# Patient Record
Sex: FEMALE | Race: WHITE | Employment: UNEMPLOYED | URBAN - METROPOLITAN AREA
[De-identification: names, ages, dates, MRNs, and addresses within clinical notes are randomized per-mention and may not be internally consistent; named-entity substitution may affect disease eponyms.]

---

## 2017-08-14 ENCOUNTER — TRANSFERRED RECORDS (OUTPATIENT)
Dept: HEALTH INFORMATION MANAGEMENT | Facility: CLINIC | Age: 42
End: 2017-08-14

## 2017-08-15 ENCOUNTER — TRANSFERRED RECORDS (OUTPATIENT)
Dept: HEALTH INFORMATION MANAGEMENT | Facility: CLINIC | Age: 42
End: 2017-08-15

## 2017-08-29 ENCOUNTER — TRANSFERRED RECORDS (OUTPATIENT)
Dept: HEALTH INFORMATION MANAGEMENT | Facility: CLINIC | Age: 42
End: 2017-08-29

## 2017-08-31 ENCOUNTER — TRANSFERRED RECORDS (OUTPATIENT)
Dept: HEALTH INFORMATION MANAGEMENT | Facility: CLINIC | Age: 42
End: 2017-08-31

## 2017-10-01 ENCOUNTER — HEALTH MAINTENANCE LETTER (OUTPATIENT)
Age: 42
End: 2017-10-01

## 2020-02-23 ENCOUNTER — HEALTH MAINTENANCE LETTER (OUTPATIENT)
Age: 45
End: 2020-02-23

## 2020-12-13 ENCOUNTER — HEALTH MAINTENANCE LETTER (OUTPATIENT)
Age: 45
End: 2020-12-13

## 2021-02-21 ENCOUNTER — HEALTH MAINTENANCE LETTER (OUTPATIENT)
Age: 46
End: 2021-02-21

## 2021-04-11 ENCOUNTER — HEALTH MAINTENANCE LETTER (OUTPATIENT)
Age: 46
End: 2021-04-11

## 2021-09-26 ENCOUNTER — HEALTH MAINTENANCE LETTER (OUTPATIENT)
Age: 46
End: 2021-09-26

## 2022-03-13 ENCOUNTER — HEALTH MAINTENANCE LETTER (OUTPATIENT)
Age: 47
End: 2022-03-13

## 2022-05-08 ENCOUNTER — HEALTH MAINTENANCE LETTER (OUTPATIENT)
Age: 47
End: 2022-05-08

## 2023-01-08 ENCOUNTER — HEALTH MAINTENANCE LETTER (OUTPATIENT)
Age: 48
End: 2023-01-08

## 2023-04-23 ENCOUNTER — HEALTH MAINTENANCE LETTER (OUTPATIENT)
Age: 48
End: 2023-04-23

## 2023-06-02 ENCOUNTER — HEALTH MAINTENANCE LETTER (OUTPATIENT)
Age: 48
End: 2023-06-02

## 2023-09-26 ENCOUNTER — TRANSCRIBE ORDERS (OUTPATIENT)
Dept: OTHER | Age: 48
End: 2023-09-26

## 2023-09-26 DIAGNOSIS — G60.8 POLYNEUROPATHY, PERIPHERAL SENSORIMOTOR AXONAL: Primary | ICD-10-CM

## 2023-09-27 NOTE — TELEPHONE ENCOUNTER
Action 9/27/23 MV 11.01am   Action Taken 1) imaging request faxed to [facility]  2) records request faxed to [facility]     Action 10/18/23 MV 11.04am   Action Taken 2nd request faxed to [facility] and [facility]     Action 11/8/23 MV 11.31am   Action Taken Images resolved in PACS ; [facility] records received and sent to scanning       RECORDS RECEIVED FROM: external   REASON FOR VISIT: neuropathy   Date of Appt: 1/19/24   NOTES (FOR ALL VISITS) STATUS DETAILS   OFFICE NOTE from referring provider Care Everywhere Dr Jessenia Gusman @ [facility]:  9/22/22 encounter   OFFICE NOTE from other specialist Received/CE Dr Mable Celis @ [facility]:  8/29/23    Dr Rawls @ [facility]:  8/14/17  5/19/17  11/15/15  (Additional encounters)   DISCHARGE SUMMARY from hospital Care Everywhere [facility]:  8/23/23-8/25/23   DISCHARGE REPORT from the ER Care Everywhere [facility]:  8/15/23   EMG Received/CE [facility]:  11/9/22    [facility]:  8/29/17  5/20/15   MEDICATION LIST Care Everywhere    IMAGING  (FOR ALL VISITS)     MRI (HEAD, NECK, SPINE) PACS [facility]:  MRI Lumbar Spine 8/25/21    [facility]:  MRI Head 5/23/13  MRI Cervical Spine 4/12/13  MRI Cervical Spine 12/18/12   CT (HEAD, NECK, SPINE) PACS [facility]:  CT Lumbar Spine 6/6/22

## 2023-12-03 ENCOUNTER — HEALTH MAINTENANCE LETTER (OUTPATIENT)
Age: 48
End: 2023-12-03

## 2024-01-03 ENCOUNTER — TELEPHONE (OUTPATIENT)
Dept: NEUROLOGY | Facility: CLINIC | Age: 49
End: 2024-01-03
Payer: COMMERCIAL

## 2024-01-03 NOTE — TELEPHONE ENCOUNTER
LVM for pt to sched for sooner appt with Dr. Stern on 1/5/24 if spots are astill avail.    Manually check schedule and manually enter appt    1/3/24 BD

## 2024-01-19 ENCOUNTER — PRE VISIT (OUTPATIENT)
Dept: NEUROLOGY | Facility: CLINIC | Age: 49
End: 2024-01-19

## 2025-03-24 NOTE — H&P (VIEW-ONLY)
[facility]      Preoperative Consultation   Kim Berman   : 1975   Gender: female    Date of Encounter: 3/26/2025    Nursing Notes:   [place] LPN  3/26/2025 10:37 AM  Signed  Chief Complaint   Patient presents with     Preoperative Exam     PRE OP EXAM   DOS 25   BACK SURGERY   [facility]   DR. BRENNAN MCKINLEY IS PERFORMING SURGERY   Fax- [phone].        There are no preventive care reminders to display for this patient.    Health maintenance reviewed with patient Yes  Patient presents for an in-person office visit: alone  Communication Method: Patient is active on [employer] and has been instructed that results/communications will be made via [employer]  If a phone call is needed, the preferred number is:  Mobile   Home Phone [phone]   Mobile [phone]     May we leave a detailed message at this number? Yes    Is patient in need of refills in the next 3 months? No    Kim Berman is a 49 y.o. female (1975) who presents for preop consultation at the request of Dr. Mckinley  in preparation for Back Surgery.    Date of Surgery: 25  Surgical Specialty: Spine  Dwain Mckinley MD - [facility], [facility]   Hospital/Surgical Facility:  [facility]  Fax number: [phone]  Surgery type: inpatient  Primary Physician: Debi Harding LPN ....................  3/26/2025   10:19 AM          History of Present Illness   L3-5 transforaminal lumbar interbody fusion, posterior instrumented fusion and decompression.  Does some back exercises now and has been helpful.  Today back and LE pain is ok, yesterday and a few days before were pretty bad.              Review of Systems   A comprehensive review of systems was negative except for items noted in HPI.    Patient Active Problem List   Diagnosis Code     QT prolongation R94.31     Seizure (HC) R56.9     Hypertension I10     Vasculitis I77.6     Primary osteoarthritis of left knee M17.12      Peripheral tear of medial meniscus of right knee as current injury S83.221A     Chondromalacia of medial compartment M94.261     Avascular necrosis of right proximal tibia  M87.161, T38.0X5A     Neuropathy due to medical condition (HC) G63     Closed left hip fracture (HC) S72.002A     Hypothyroidism due to Hashimoto's thyroiditis E06.3     Closed fracture of foot with nonunion S92.909K     Other osteoporosis without current pathological fracture M81.8     Alcohol use disorder/abuse F10.10     Controlled substance agreement signed-11/19/2021 Z79.899     Other fracture of right femur, initial encounter for closed fracture (HC) S72.8X1A     Hammer toe M20.40     Plantar ulcer of left foot with fat layer exposed (HC) L97.522     Smoker F17.200     Endometrial polyp N84.0     Family history of ovarian cancer Z80.41     Current Outpatient Medications   Medication Sig     cyanocobalamin (VITAMIN B12) 1,000 mcg sublingual tablet Place 1 Tablet (1,000 mcg) under the tongue once daily.     DULoxetine (CYMBALTA) 30 mg Delayed-release capsule TAKE 1 CAPSULE (30 MG) BY MOUTH ONCE DAILY IN THE MORNING     DULoxetine (CYMBALTA) 60 mg Delayed-release capsule TAKE 1 CAPSULE (60 MG) BY MOUTH ONCE DAILY IN THE EVENING (IN ADDITION TO THE 30 MG MORNING DOSE)     ergocalciferol (,vitamin D2,) 50,000 unit capsule Take 1 capsule by mouth every Monday and Thursday     levothyroxine (SYNTHROID) 88 mcg tablet TAKE 1 TABLET (88 MCG) BY MOUTH DAILY BEFORE BREAKFAST     magnesium glycinate 100 mg magnesium cap Take 400 mg by mouth once daily. Please take 1 capsule daily for 3 days, if tolerates, then 2 capsules daily for 3 days, if tolerates, then 3 capsules daily for 3 days and if tolerates can increase to 4 capsules daily     methocarbamoL 750 mg tablet TAKE 1 TABLET (750 MG) BY MOUTH 4 TIMES DAILY IF NEEDED FOR MUSCLE SPASM.     multivitamin (MVI) tablet Take 1 Tablet by mouth once daily.     pregabalin (Lyrica) 300 mg capsule Take 1  Capsule (300 mg) by mouth two times daily.     rOPINIRole (REQUIP) 1 mg tablet TAKE ONE-HALF TABLET BY MOUTH AT NIGHT FOR 5 TO 7 DAYS THEN MAY INCREASE TO ONE TABLET BY MOUTH EVERY BEDTIME IF NEEDED     No current facility-administered medications for this visit.     Medications have been reviewed by me and are current to the best of my knowledge and ability.     Allergies   Allergen Reactions     Morphine Rash, Itching and Agitation     Ciprofloxacin Other - Describe In Comment Field     Flared neuropathy symptoms      Codeine *Unknown     Past Surgical History:   . Laterality Date      Debridement of wound flap closure left foot first metatarsal ulceration, debridement of wound fifth digit left foot with arthroplasty  2023    Dr Berman     BIOPSY BONE MARROW  2019    Marilu Colón      SECTION       debridement to muscle,removal soft tissue mass right foot and rotational  flap closure ulceration right foot - Right Right 2020     DILATION AND CURETTAGE OF UTERUS  3/1/2024     ENDOSCOPY GI       FIRST TARSAL METATARSAL JOINT ARTHRODESIS RIGHT FOOT, OPEN REDUCTION WITH INTERNAL FIXATION, SECOND METATARSAL RIGHT FOOT, POSSIBLE BONE GRAFTING RIGHT FOOT  Right 2018     HYSTEROSCOPY W/ POLYPECTOMY  3/1/2024     LEFT FOOT REPEAT INCISION AND DRAINAGE WITH DELAYED PRIMARY CLOSURE Left 10/04/2023    DR. REYES     left hip plate Left 2021    had a hardware fracture     OPEN REDUCTION INTERNAL FIXATION LEFT HIP Left 2019    KRYSTIAN GAMMA NAIL     OPEN REDUCTION INTERNAL FIXATION RIGHT HIP  Right 2022    Dr. Salo Sellers     PICC LINE  10/05/2023    removed     removal of hardware right foot first and 2nd metatarsal debridement of bone with biopsy right foot collagen graft to plantar ulceration right foot , placement of antibiotic paste Right 2019    Dr Amezquita     SCS implant  2015    [facility], no help     SPINAL CORD STIMULATOR REMOVAL  2018     Social History  "    Tobacco Use     Smoking status: Every Day     Current packs/day: 0.50     Types: Cigarettes     Passive exposure: Never     Smokeless tobacco: Never   Vaping Use     Vaping status: Never Used   Substance Use Topics     Alcohol use: Yes     Alcohol/week: 6.0 standard drinks of alcohol     Types: 6 Standard drinks or equivalent per week     Comment: socially     Drug use: No     Family History   Problem Relation Age of Onset     Osteoporosis Mother      Allergies Mother      COPD Mother      Diabetes Father      Stroke Maternal Grandmother      Osteoporosis Maternal Aunt      Cancer-ovarian Maternal Aunt      Cancer-ovarian Maternal Aunt      Cancer-breast No Family History          PAST DIFFICULTY WITH ANESTHESIA: None     Physical Exam   /84 (Cuff Site: Right Arm, Position: Sitting, Cuff Size: Adult Regular)   Pulse 70   Ht 1.715 m (5' 7.52\")   Wt 74.8 kg (164 lb 12.8 oz)   LMP 03/09/2015   BMI 25.42 kg/m   Body mass index is 25.42 kg/m .    General Appearance: Pleasant, alert, appropriate appearance for age. No acute distress  Head Exam: Normal. Normocephalic, atraumatic.  Neck Exam: Supple, no masses or nodes.  Chest/Respiratory Exam: Normal chest wall and respirations. Clear to auscultation.  Cardiovascular Exam: Regular rate and rhythm. S1, S2, no murmur, click, gallop, or rubs.  Musculoskeletal Exam: Back is straight and non-tender, full ROM of upper and lower extremities.  Neurologic Exam: Nonfocal; symmetric DTRs, normal gross motor movement, tone, and coordination. No tremor.  Psychiatric Exam: Alert and oriented, appropriate affect.     Assessment / Plan     Labs: no  ECG: no    ICD-10-CM    1. Preoperative cardiovascular examination  Z01.810       2. Spinal stenosis of lumbar region without neurogenic claudication  M48.061       3. Hypertension - elevated, continue lifestyle changes and checking BPs, goal < 130/80s I10       4. Other osteoporosis without current pathological fracture - on " supps, had a reclast infusion M81.8       5. Neuropathy due to medical condition (HC)  G63       6. Alcohol use disorder/abuse - stable F10.10       7. Smoker - cutting down F17.200           Patient is cleared for planned procedure.   Electronically Signed by:   Debi Harding MD   3/26/2025      The Pre-Op Tool    Recommendations      Intermediate Risk Procedure    Risk of CV Complication (RCRI)  0.5%    Current Cardiac Status  Good exertional capacity ( > 4 mets )    Cardiac History  No history of coronary artery disease           Labs  HGB within last 30 days  EKG  Not indicated  CXR  Not indicated    Stress Testing  Not indicated    * Testing recommendations are intended to assist, but not direct, clinical decisions.           Type & Screen should be obtained by Anesthesia only if the risk of transfusion is > 5% for the procedure         Take your other medications as usual prior to the procedure  Hold vitamins and/or supplements for 1 week prior to the procedure  Okay to take Acetaminophen (Tylenol) up until the procedure  Hold / avoid NSAIDs (e.g. ibuprofen, naproxen) prior to procedure: 2 days for ibuprofen (Advil) and 4 days for naproxen (Aleve).    * Medication recommendations are not intended to be exhaustive; they are limited to common medications that are potentially dangerous if incorrectly managed          Labs  * Data supports elimination of  routine  laboratory testing in favor of focused,  indicated  testing based on medical co-morbidities. A 2009 study randomized 1061 patients undergoing ambulatory, non-cataract surgery to routine or to indicated testing. Perioperative adverse events were similar (Anesthesia & Analgesia 2009;108:467-75; Anesthesiol. Clin. 2016 Mar;34(1):43-58).  EKG  * Age alone is not an absolute indication for a preoperative EKG, and in the absence of clinical risk factors, there is no consensus on the utility of routine preoperative EKG. Our experts recommend against obtaining an  EKG if age < 65 for intermediate risk procedures (Anesthesology. 2012;116(3) 1-17; JACC. 2014;64(21);e1-76).  Stress Testing  * The current ACC/AHA guideline states that 'non-invasive stress testing is not useful for patients [with no clinical risk factors] undergoing noncardiac surgery' (JACC. 2014;64(21);e1-76.).     Session ID: [id number]  Endnotes and bibliography available upon request: [email or web address]

## 2025-04-15 ENCOUNTER — TRANSFERRED RECORDS (OUTPATIENT)
Dept: HEALTH INFORMATION MANAGEMENT | Facility: CLINIC | Age: 50
End: 2025-04-15
Payer: COMMERCIAL

## 2025-04-22 ENCOUNTER — ANESTHESIA EVENT (OUTPATIENT)
Dept: SURGERY | Facility: HOSPITAL | Age: 50
End: 2025-04-22
Payer: COMMERCIAL

## 2025-04-22 RX ORDER — METHOCARBAMOL 750 MG/1
750 TABLET, FILM COATED ORAL 4 TIMES DAILY PRN
COMMUNITY

## 2025-04-22 RX ORDER — DULOXETIN HYDROCHLORIDE 60 MG/1
60 CAPSULE, DELAYED RELEASE ORAL EVERY EVENING
COMMUNITY

## 2025-04-22 RX ORDER — DULOXETIN HYDROCHLORIDE 30 MG/1
30 CAPSULE, DELAYED RELEASE ORAL EVERY MORNING
COMMUNITY

## 2025-04-22 RX ORDER — PREGABALIN 300 MG/1
300 CAPSULE ORAL 2 TIMES DAILY
COMMUNITY

## 2025-04-22 RX ORDER — ERGOCALCIFEROL 1.25 MG/1
50000 CAPSULE, LIQUID FILLED ORAL
COMMUNITY

## 2025-04-22 RX ORDER — MAGNESIUM GLYCINATE 100 MG
200 CAPSULE ORAL 2 TIMES DAILY
Status: ON HOLD | COMMUNITY
End: 2025-04-23

## 2025-04-22 RX ORDER — THERA TABS 400 MCG
1 TAB ORAL DAILY
COMMUNITY

## 2025-04-22 RX ORDER — ROPINIROLE 1 MG/1
1 TABLET, FILM COATED ORAL AT BEDTIME
COMMUNITY

## 2025-04-22 ASSESSMENT — LIFESTYLE VARIABLES: TOBACCO_USE: 1

## 2025-04-22 NOTE — ANESTHESIA PREPROCEDURE EVALUATION
Anesthesia Pre-Procedure Evaluation    Patient: Kim Berman   MRN: [id number] : 1975        Procedure : Procedure(s):  LEFT FOOT IRRIGATION AND DEBRIDEMENT WITH  GREAT TOE SESAMOID EXCISION,  POSSIBLE GASTROCNEMIUS RELEASE          Past Medical History:   Diagnosis Date    Alcohol abuse -6/26/10    Hypertension     Hypokalemia -6/26/10    Hospitalized @ [facility], and hypomagnesemia    Hypothyroidism due to Hashimoto's thyroiditis     Malnutrition -6/26/10    Neuropathy     Osteoarthritis of left knee     Osteomyelitis (H)     Osteoporosis     Pancytopenia -6/26/10    QT prolongation     Seizure (H)     Spinal stenosis of lumbar region without neurogenic claudication     Unspecified essential hypertension     Essential hypertension    Vasculitis       Past Surgical History:   Procedure Laterality Date    BIOPSY BONE MARROW 2019       SECTION      Debridement of wound flap closure left foot first metatarsal ulceration, debridement of wound fifth digit left foot with arthroplasty 2023      debridement to muscle,removal soft tissue mass right foot and rotational flap closure ulceration right foot - Right Right 2020      DILATION AND CURETTAGE OF UTERUS 3/1/2024      FIRST TARSAL METATARSAL JOINT ARTHRODESIS RIGHT FOOT, OPEN REDUCTION WITH INTERNAL FIXATION, SECOND METATARSAL RIGHT FOOT, POSSIBLE BONE GRAFTING RIGHT FOOT Right 2018      HYSTEROSCOPY W/ POLYPECTOMY 3/1/2024      IR LUMBAR PUNCTURE  03/15/2024    LEFT FOOT REPEAT INCISION AND DRAINAGE WITH DELAYED PRIMARY CLOSURE Left 10/04/2023      left hip plate Left 2021      OPEN REDUCTION INTERNAL FIXATION LEFT HIP Left 2019      OPEN REDUCTION INTERNAL FIXATION RIGHT HIP Right 2022      removal of hardware right foot first and 2nd metatarsal debridement of bone with biopsy right foot collagen graft to plantar ulceration right foot , placement of antibiotic paste Right 2019      [facility]  implant 2015  [facility], no help  SPINAL CORD STIMULATOR REMOVAL 02/12/2018       UPPER GI ENDOSCOPY        Allergies   Allergen Reactions    Ciprofloxacin Other (See Comments)     Flared neuropathy symptoms    Morphine And Codeine Other (See Comments) and Itching     Agitation      Social History     Tobacco Use    Smoking status: Every Day     Current packs/day: 1.00     Types: Cigarettes     Passive exposure: Never    Smokeless tobacco: Never   Substance Use Topics    Alcohol use: No     Comment: quit since her last hospital discharge 5 weeks ago      Wt Readings from Last 1 Encounters:   02/06/13 59.9 kg (132 lb)        Anesthesia Evaluation            ROS/MED HX  ENT/Pulmonary:  - neg pulmonary ROS   (+)                tobacco use, Current use,                       Neurologic:     (+)    peripheral neuropathy,                            Cardiovascular:     (+)  hypertension- -   -  - -                                      METS/Exercise Tolerance:     Hematologic:  - neg hematologic  ROS     Musculoskeletal:  - neg musculoskeletal ROS     GI/Hepatic:  - neg GI/hepatic ROS     Renal/Genitourinary:  - neg Renal ROS     Endo:     (+)          thyroid problem,            Psychiatric/Substance Use: Comment: Etoh use disorder      Infectious Disease:  - neg infectious disease ROS     Malignancy:  - neg malignancy ROS     Other:            Physical Exam    Airway  airway exam normal      Mallampati: II   TM distance: > 3 FB   Neck ROM: full   Mouth opening: > 3 cm    Respiratory Devices and Support         Dental       (+) Edentulous      Cardiovascular   cardiovascular exam normal          Pulmonary   pulmonary exam normal                OUTSIDE LABS:  CBC:   Lab Results   Component Value Date    WBC 3.7 (L) 06/24/2010    WBC 3.8 (L) 06/23/2010    HGB 12.1 09/30/2011    HGB 8.8 (L) 06/24/2010    HCT 26.6 (L) 06/24/2010    HCT 26.5 (L) 06/23/2010     (L) 06/24/2010    PLT 56 (L) 06/23/2010     BMP:   Lab Results    Component Value Date     02/06/2013     09/30/2011    POTASSIUM 4.1 02/06/2013    POTASSIUM 3.2 (L) 09/30/2011    CHLORIDE 100 02/06/2013    CHLORIDE 98 09/30/2011    CO2 26 02/06/2013    CO2 32 09/30/2011    BUN 16 02/06/2013    BUN 4 (L) 09/30/2011    CR 0.67 02/06/2013    CR 0.58 09/30/2011    GLC 88 02/06/2013    GLC 87 09/30/2011     COAGS:   Lab Results   Component Value Date    PTT 30 03/03/2010    INR 1.05 06/23/2010     POC:   Lab Results   Component Value Date    HCG  03/01/2010     Negative   This test provides a presumptive diagnosis of pregnancy or non-pregnancy. A   confirmed pregnancy diagnosis should only be made by a physician after all   clinical and laboratory findings have been evaluated.     HEPATIC:   Lab Results   Component Value Date    ALBUMIN 3.9 02/06/2013    PROTTOTAL 7.1 02/06/2013    ALT 34 02/06/2013    AST 20 02/06/2013    ALKPHOS 48 02/06/2013    BILITOTAL 0.6 02/06/2013     OTHER:   Lab Results   Component Value Date    LACT 2.4 (H) 06/23/2010    A1C 4.4 02/06/2013    NANCY 8.9 02/06/2013    PHOS 2.6 06/24/2010    MAG 1.7 06/26/2010    LIPASE 62 06/23/2010    AMYLASE <30 (L) 06/23/2010    TSH 2.57 05/12/2010    CRP <5.0 06/25/2010    SED 28 (H) 06/25/2010       Anesthesia Plan    ASA Status:  3       Anesthesia Type: MAC.   Induction: Intravenous.   Maintenance: Balanced.        Consents    Anesthesia Plan(s) and associated risks, benefits, and realistic alternatives discussed. Questions answered and patient/representative(s) expressed understanding.     - Discussed: Risks, Benefits and Alternatives for BOTH SEDATION and the PROCEDURE were discussed     - Discussed with:       - Extended Intubation/Ventilatory Support Discussed: No.      - Patient is DNR/DNI Status: No     Use of blood products discussed: No .     Postoperative Care    Pain management: Peripheral nerve block (Single Shot).   PONV prophylaxis: Ondansetron (or other 5HT-3), Dexamethasone or Solumedrol      Comments:               Kaiden Donato MD    Clinically Significant Risk Factors Present on Admission                   # Hypertension: Noted on problem list

## 2025-04-23 ENCOUNTER — APPOINTMENT (OUTPATIENT)
Dept: RADIOLOGY | Facility: HOSPITAL | Age: 50
End: 2025-04-23
Attending: ORTHOPAEDIC SURGERY
Payer: COMMERCIAL

## 2025-04-23 ENCOUNTER — HOSPITAL ENCOUNTER (OUTPATIENT)
Facility: HOSPITAL | Age: 50
End: 2025-04-23
Attending: ORTHOPAEDIC SURGERY | Admitting: ORTHOPAEDIC SURGERY
Payer: COMMERCIAL

## 2025-04-23 ENCOUNTER — ANESTHESIA (OUTPATIENT)
Dept: SURGERY | Facility: HOSPITAL | Age: 50
End: 2025-04-23
Payer: COMMERCIAL

## 2025-04-23 DIAGNOSIS — M86.172 OTHER ACUTE OSTEOMYELITIS OF LEFT FOOT (H): Primary | ICD-10-CM

## 2025-04-23 PROBLEM — M86.9 OSTEOMYELITIS (H): Status: ACTIVE | Noted: 2025-04-23

## 2025-04-23 LAB
BACTERIA SPEC CULT: ABNORMAL
BACTERIA SPEC CULT: ABNORMAL
CREAT SERPL-MCNC: 0.76 MG/DL (ref 0.51–0.95)
EGFRCR SERPLBLD CKD-EPI 2021: >90 ML/MIN/1.73M2
GLUCOSE BLDC GLUCOMTR-MCNC: 94 MG/DL (ref 70–99)
GRAM STAIN RESULT: ABNORMAL
PLATELET # BLD AUTO: 93 10E3/UL (ref 150–450)

## 2025-04-23 PROCEDURE — 250N000013 HC RX MED GY IP 250 OP 250 PS 637: Performed by: PHYSICIAN ASSISTANT

## 2025-04-23 PROCEDURE — 87205 SMEAR GRAM STAIN: CPT | Performed by: ORTHOPAEDIC SURGERY

## 2025-04-23 PROCEDURE — 87102 FUNGUS ISOLATION CULTURE: CPT | Performed by: ORTHOPAEDIC SURGERY

## 2025-04-23 PROCEDURE — 99204 OFFICE O/P NEW MOD 45 MIN: CPT | Performed by: INTERNAL MEDICINE

## 2025-04-23 PROCEDURE — 710N000012 HC RECOVERY PHASE 2, PER MINUTE: Performed by: ORTHOPAEDIC SURGERY

## 2025-04-23 PROCEDURE — 360N000083 HC SURGERY LEVEL 3 W/ FLUORO, PER MIN: Performed by: ORTHOPAEDIC SURGERY

## 2025-04-23 PROCEDURE — 272N000001 HC OR GENERAL SUPPLY STERILE: Performed by: ORTHOPAEDIC SURGERY

## 2025-04-23 PROCEDURE — 258N000001 HC RX 258: Performed by: ORTHOPAEDIC SURGERY

## 2025-04-23 PROCEDURE — 36415 COLL VENOUS BLD VENIPUNCTURE: CPT | Performed by: ORTHOPAEDIC SURGERY

## 2025-04-23 PROCEDURE — 250N000011 HC RX IP 250 OP 636: Performed by: INTERNAL MEDICINE

## 2025-04-23 PROCEDURE — 85049 AUTOMATED PLATELET COUNT: CPT | Performed by: ORTHOPAEDIC SURGERY

## 2025-04-23 PROCEDURE — 999N000141 HC STATISTIC PRE-PROCEDURE NURSING ASSESSMENT: Performed by: ORTHOPAEDIC SURGERY

## 2025-04-23 PROCEDURE — 87075 CULTR BACTERIA EXCEPT BLOOD: CPT | Performed by: ORTHOPAEDIC SURGERY

## 2025-04-23 PROCEDURE — 250N000009 HC RX 250: Performed by: NURSE ANESTHETIST, CERTIFIED REGISTERED

## 2025-04-23 PROCEDURE — 250N000011 HC RX IP 250 OP 636: Mod: JZ | Performed by: ANESTHESIOLOGY

## 2025-04-23 PROCEDURE — 250N000011 HC RX IP 250 OP 636: Performed by: ANESTHESIOLOGY

## 2025-04-23 PROCEDURE — 999N000182 XR SURGERY CARM FLUORO GREATER THAN 5 MIN

## 2025-04-23 PROCEDURE — 370N000017 HC ANESTHESIA TECHNICAL FEE, PER MIN: Performed by: ORTHOPAEDIC SURGERY

## 2025-04-23 PROCEDURE — 87176 TISSUE HOMOGENIZATION CULTR: CPT | Performed by: ORTHOPAEDIC SURGERY

## 2025-04-23 PROCEDURE — 258N000003 HC RX IP 258 OP 636: Performed by: ANESTHESIOLOGY

## 2025-04-23 PROCEDURE — 88311 DECALCIFY TISSUE: CPT | Mod: TC | Performed by: ORTHOPAEDIC SURGERY

## 2025-04-23 PROCEDURE — 258N000003 HC RX IP 258 OP 636: Performed by: PHYSICIAN ASSISTANT

## 2025-04-23 PROCEDURE — 250N000013 HC RX MED GY IP 250 OP 250 PS 637: Performed by: INTERNAL MEDICINE

## 2025-04-23 PROCEDURE — 250N000009 HC RX 250: Performed by: ORTHOPAEDIC SURGERY

## 2025-04-23 PROCEDURE — 250N000011 HC RX IP 250 OP 636: Performed by: NURSE ANESTHETIST, CERTIFIED REGISTERED

## 2025-04-23 PROCEDURE — 82565 ASSAY OF CREATININE: CPT | Performed by: ORTHOPAEDIC SURGERY

## 2025-04-23 PROCEDURE — 99222 1ST HOSP IP/OBS MODERATE 55: CPT | Performed by: INTERNAL MEDICINE

## 2025-04-23 PROCEDURE — 258N000003 HC RX IP 258 OP 636: Performed by: NURSE ANESTHETIST, CERTIFIED REGISTERED

## 2025-04-23 PROCEDURE — 87206 SMEAR FLUORESCENT/ACID STAI: CPT | Performed by: ORTHOPAEDIC SURGERY

## 2025-04-23 RX ORDER — SENNOSIDES 8.6 MG
1300 CAPSULE ORAL EVERY 8 HOURS PRN
Status: ON HOLD | COMMUNITY
End: 2025-04-25

## 2025-04-23 RX ORDER — NALOXONE HYDROCHLORIDE 0.4 MG/ML
0.4 INJECTION, SOLUTION INTRAMUSCULAR; INTRAVENOUS; SUBCUTANEOUS
Status: DISCONTINUED | OUTPATIENT
Start: 2025-04-23 | End: 2025-04-25 | Stop reason: HOSPADM

## 2025-04-23 RX ORDER — ACETAMINOPHEN 325 MG/1
975 TABLET ORAL EVERY 8 HOURS
Status: DISCONTINUED | OUTPATIENT
Start: 2025-04-23 | End: 2025-04-25 | Stop reason: HOSPADM

## 2025-04-23 RX ORDER — CEFAZOLIN SODIUM 1 G/3ML
INJECTION, POWDER, FOR SOLUTION INTRAMUSCULAR; INTRAVENOUS PRN
Status: DISCONTINUED | OUTPATIENT
Start: 2025-04-23 | End: 2025-04-23

## 2025-04-23 RX ORDER — HYDROMORPHONE HCL IN WATER/PF 6 MG/30 ML
0.2 PATIENT CONTROLLED ANALGESIA SYRINGE INTRAVENOUS EVERY 5 MIN PRN
Status: DISCONTINUED | OUTPATIENT
Start: 2025-04-23 | End: 2025-04-23 | Stop reason: HOSPADM

## 2025-04-23 RX ORDER — SODIUM CHLORIDE, SODIUM LACTATE, POTASSIUM CHLORIDE, CALCIUM CHLORIDE 600; 310; 30; 20 MG/100ML; MG/100ML; MG/100ML; MG/100ML
INJECTION, SOLUTION INTRAVENOUS CONTINUOUS
Status: DISCONTINUED | OUTPATIENT
Start: 2025-04-23 | End: 2025-04-23 | Stop reason: HOSPADM

## 2025-04-23 RX ORDER — THERA TABS 400 MCG
1 TAB ORAL DAILY
Status: DISCONTINUED | OUTPATIENT
Start: 2025-04-23 | End: 2025-04-25 | Stop reason: HOSPADM

## 2025-04-23 RX ORDER — MAGNESIUM HYDROXIDE 1200 MG/15ML
LIQUID ORAL PRN
Status: DISCONTINUED | OUTPATIENT
Start: 2025-04-23 | End: 2025-04-23 | Stop reason: HOSPADM

## 2025-04-23 RX ORDER — OXYCODONE HYDROCHLORIDE 5 MG/1
10 TABLET ORAL EVERY 4 HOURS PRN
Status: DISCONTINUED | OUTPATIENT
Start: 2025-04-23 | End: 2025-04-25 | Stop reason: HOSPADM

## 2025-04-23 RX ORDER — ASPIRIN 81 MG/1
81 TABLET ORAL 2 TIMES DAILY
Status: DISCONTINUED | OUTPATIENT
Start: 2025-04-23 | End: 2025-04-25 | Stop reason: HOSPADM

## 2025-04-23 RX ORDER — DULOXETIN HYDROCHLORIDE 60 MG/1
60 CAPSULE, DELAYED RELEASE ORAL EVERY EVENING
Status: DISCONTINUED | OUTPATIENT
Start: 2025-04-23 | End: 2025-04-25 | Stop reason: HOSPADM

## 2025-04-23 RX ORDER — LIDOCAINE 40 MG/G
CREAM TOPICAL
Status: DISCONTINUED | OUTPATIENT
Start: 2025-04-23 | End: 2025-04-23 | Stop reason: HOSPADM

## 2025-04-23 RX ORDER — CEFAZOLIN SODIUM/WATER 2 G/20 ML
SYRINGE (ML) INTRAVENOUS
Status: DISCONTINUED
Start: 2025-04-23 | End: 2025-04-23 | Stop reason: HOSPADM

## 2025-04-23 RX ORDER — ROPINIROLE 1 MG/1
1 TABLET, FILM COATED ORAL AT BEDTIME
Status: DISCONTINUED | OUTPATIENT
Start: 2025-04-23 | End: 2025-04-25 | Stop reason: HOSPADM

## 2025-04-23 RX ORDER — ONDANSETRON 4 MG/1
4 TABLET, ORALLY DISINTEGRATING ORAL EVERY 6 HOURS PRN
Status: DISCONTINUED | OUTPATIENT
Start: 2025-04-23 | End: 2025-04-25 | Stop reason: HOSPADM

## 2025-04-23 RX ORDER — CEFAZOLIN SODIUM 1 G/3ML
1 INJECTION, POWDER, FOR SOLUTION INTRAMUSCULAR; INTRAVENOUS EVERY 8 HOURS
Status: DISCONTINUED | OUTPATIENT
Start: 2025-04-23 | End: 2025-04-23

## 2025-04-23 RX ORDER — HYDROMORPHONE HCL IN WATER/PF 6 MG/30 ML
0.4 PATIENT CONTROLLED ANALGESIA SYRINGE INTRAVENOUS
Status: DISCONTINUED | OUTPATIENT
Start: 2025-04-23 | End: 2025-04-25 | Stop reason: HOSPADM

## 2025-04-23 RX ORDER — FENTANYL CITRATE 50 UG/ML
25 INJECTION, SOLUTION INTRAMUSCULAR; INTRAVENOUS EVERY 5 MIN PRN
Status: DISCONTINUED | OUTPATIENT
Start: 2025-04-23 | End: 2025-04-23 | Stop reason: HOSPADM

## 2025-04-23 RX ORDER — OXYCODONE HYDROCHLORIDE 5 MG/1
5 TABLET ORAL EVERY 4 HOURS PRN
Status: DISCONTINUED | OUTPATIENT
Start: 2025-04-23 | End: 2025-04-25 | Stop reason: HOSPADM

## 2025-04-23 RX ORDER — HYDROMORPHONE HCL IN WATER/PF 6 MG/30 ML
0.4 PATIENT CONTROLLED ANALGESIA SYRINGE INTRAVENOUS EVERY 5 MIN PRN
Status: DISCONTINUED | OUTPATIENT
Start: 2025-04-23 | End: 2025-04-23 | Stop reason: HOSPADM

## 2025-04-23 RX ORDER — PREGABALIN 100 MG/1
300 CAPSULE ORAL 2 TIMES DAILY
Status: DISCONTINUED | OUTPATIENT
Start: 2025-04-23 | End: 2025-04-25 | Stop reason: HOSPADM

## 2025-04-23 RX ORDER — PROPOFOL 10 MG/ML
INJECTION, EMULSION INTRAVENOUS CONTINUOUS PRN
Status: DISCONTINUED | OUTPATIENT
Start: 2025-04-23 | End: 2025-04-23

## 2025-04-23 RX ORDER — METHOCARBAMOL 750 MG/1
750 TABLET, FILM COATED ORAL 4 TIMES DAILY PRN
Status: DISCONTINUED | OUTPATIENT
Start: 2025-04-23 | End: 2025-04-25 | Stop reason: HOSPADM

## 2025-04-23 RX ORDER — PROCHLORPERAZINE MALEATE 10 MG
10 TABLET ORAL EVERY 6 HOURS PRN
Status: DISCONTINUED | OUTPATIENT
Start: 2025-04-23 | End: 2025-04-25 | Stop reason: HOSPADM

## 2025-04-23 RX ORDER — SODIUM CHLORIDE, SODIUM LACTATE, POTASSIUM CHLORIDE, CALCIUM CHLORIDE 600; 310; 30; 20 MG/100ML; MG/100ML; MG/100ML; MG/100ML
INJECTION, SOLUTION INTRAVENOUS CONTINUOUS
Status: DISCONTINUED | OUTPATIENT
Start: 2025-04-23 | End: 2025-04-25 | Stop reason: HOSPADM

## 2025-04-23 RX ORDER — ONDANSETRON 2 MG/ML
INJECTION INTRAMUSCULAR; INTRAVENOUS PRN
Status: DISCONTINUED | OUTPATIENT
Start: 2025-04-23 | End: 2025-04-23

## 2025-04-23 RX ORDER — DULOXETIN HYDROCHLORIDE 30 MG/1
30 CAPSULE, DELAYED RELEASE ORAL EVERY MORNING
Status: DISCONTINUED | OUTPATIENT
Start: 2025-04-24 | End: 2025-04-25 | Stop reason: HOSPADM

## 2025-04-23 RX ORDER — IBUPROFEN 200 MG
600 TABLET ORAL EVERY 6 HOURS PRN
Status: DISCONTINUED | OUTPATIENT
Start: 2025-04-23 | End: 2025-04-25 | Stop reason: HOSPADM

## 2025-04-23 RX ORDER — PROPOFOL 10 MG/ML
INJECTION, EMULSION INTRAVENOUS PRN
Status: DISCONTINUED | OUTPATIENT
Start: 2025-04-23 | End: 2025-04-23

## 2025-04-23 RX ORDER — ONDANSETRON 2 MG/ML
4 INJECTION INTRAMUSCULAR; INTRAVENOUS EVERY 6 HOURS PRN
Status: DISCONTINUED | OUTPATIENT
Start: 2025-04-23 | End: 2025-04-25 | Stop reason: HOSPADM

## 2025-04-23 RX ORDER — FENTANYL CITRATE 50 UG/ML
50 INJECTION, SOLUTION INTRAMUSCULAR; INTRAVENOUS EVERY 5 MIN PRN
Status: DISCONTINUED | OUTPATIENT
Start: 2025-04-23 | End: 2025-04-23 | Stop reason: HOSPADM

## 2025-04-23 RX ORDER — LANOLIN ALCOHOL/MO/W.PET/CERES
1000 CREAM (GRAM) TOPICAL DAILY
Status: DISCONTINUED | OUTPATIENT
Start: 2025-04-23 | End: 2025-04-25 | Stop reason: HOSPADM

## 2025-04-23 RX ORDER — ROPIVACAINE HYDROCHLORIDE 5 MG/ML
INJECTION, SOLUTION EPIDURAL; INFILTRATION; PERINEURAL
Status: COMPLETED | OUTPATIENT
Start: 2025-04-23 | End: 2025-04-23

## 2025-04-23 RX ORDER — CEFAZOLIN SODIUM 2 G/50ML
2 SOLUTION INTRAVENOUS EVERY 8 HOURS
Status: DISCONTINUED | OUTPATIENT
Start: 2025-04-23 | End: 2025-04-23

## 2025-04-23 RX ORDER — AMOXICILLIN 250 MG
1 CAPSULE ORAL 2 TIMES DAILY
Status: DISCONTINUED | OUTPATIENT
Start: 2025-04-23 | End: 2025-04-25 | Stop reason: HOSPADM

## 2025-04-23 RX ORDER — LIDOCAINE HYDROCHLORIDE 10 MG/ML
INJECTION, SOLUTION INFILTRATION; PERINEURAL PRN
Status: DISCONTINUED | OUTPATIENT
Start: 2025-04-23 | End: 2025-04-23

## 2025-04-23 RX ORDER — NALOXONE HYDROCHLORIDE 0.4 MG/ML
0.2 INJECTION, SOLUTION INTRAMUSCULAR; INTRAVENOUS; SUBCUTANEOUS
Status: DISCONTINUED | OUTPATIENT
Start: 2025-04-23 | End: 2025-04-25 | Stop reason: HOSPADM

## 2025-04-23 RX ORDER — CEFEPIME HYDROCHLORIDE 2 G/1
2 INJECTION, POWDER, FOR SOLUTION INTRAVENOUS EVERY 8 HOURS
Status: DISCONTINUED | OUTPATIENT
Start: 2025-04-23 | End: 2025-04-24

## 2025-04-23 RX ORDER — ONDANSETRON 2 MG/ML
4 INJECTION INTRAMUSCULAR; INTRAVENOUS EVERY 30 MIN PRN
Status: DISCONTINUED | OUTPATIENT
Start: 2025-04-23 | End: 2025-04-23 | Stop reason: HOSPADM

## 2025-04-23 RX ORDER — HYDROXYZINE HYDROCHLORIDE 25 MG/1
25 TABLET, FILM COATED ORAL EVERY 6 HOURS PRN
Status: DISCONTINUED | OUTPATIENT
Start: 2025-04-23 | End: 2025-04-25 | Stop reason: HOSPADM

## 2025-04-23 RX ORDER — BISACODYL 10 MG
10 SUPPOSITORY, RECTAL RECTAL DAILY PRN
Status: DISCONTINUED | OUTPATIENT
Start: 2025-04-26 | End: 2025-04-25 | Stop reason: HOSPADM

## 2025-04-23 RX ORDER — HYDROMORPHONE HCL IN WATER/PF 6 MG/30 ML
0.2 PATIENT CONTROLLED ANALGESIA SYRINGE INTRAVENOUS
Status: DISCONTINUED | OUTPATIENT
Start: 2025-04-23 | End: 2025-04-25 | Stop reason: HOSPADM

## 2025-04-23 RX ORDER — DEXAMETHASONE SODIUM PHOSPHATE 4 MG/ML
4 INJECTION, SOLUTION INTRA-ARTICULAR; INTRALESIONAL; INTRAMUSCULAR; INTRAVENOUS; SOFT TISSUE
Status: DISCONTINUED | OUTPATIENT
Start: 2025-04-23 | End: 2025-04-23 | Stop reason: HOSPADM

## 2025-04-23 RX ORDER — NALOXONE HYDROCHLORIDE 1 MG/ML
0.1 INJECTION INTRAMUSCULAR; INTRAVENOUS; SUBCUTANEOUS
Status: DISCONTINUED | OUTPATIENT
Start: 2025-04-23 | End: 2025-04-23 | Stop reason: HOSPADM

## 2025-04-23 RX ORDER — LIDOCAINE 40 MG/G
CREAM TOPICAL
Status: DISCONTINUED | OUTPATIENT
Start: 2025-04-23 | End: 2025-04-25 | Stop reason: HOSPADM

## 2025-04-23 RX ORDER — POLYETHYLENE GLYCOL 3350 17 G/17G
17 POWDER, FOR SOLUTION ORAL DAILY
Status: DISCONTINUED | OUTPATIENT
Start: 2025-04-24 | End: 2025-04-25 | Stop reason: HOSPADM

## 2025-04-23 RX ORDER — ERGOCALCIFEROL 1.25 MG/1
50000 CAPSULE, LIQUID FILLED ORAL
Status: DISCONTINUED | OUTPATIENT
Start: 2025-04-24 | End: 2025-04-25 | Stop reason: HOSPADM

## 2025-04-23 RX ORDER — ONDANSETRON 4 MG/1
4 TABLET, ORALLY DISINTEGRATING ORAL EVERY 30 MIN PRN
Status: DISCONTINUED | OUTPATIENT
Start: 2025-04-23 | End: 2025-04-23 | Stop reason: HOSPADM

## 2025-04-23 RX ORDER — LEVOTHYROXINE SODIUM 88 UG/1
88 TABLET ORAL DAILY
Status: DISCONTINUED | OUTPATIENT
Start: 2025-04-24 | End: 2025-04-25 | Stop reason: HOSPADM

## 2025-04-23 RX ADMIN — ASPIRIN 81 MG: 81 TABLET, COATED ORAL at 11:23

## 2025-04-23 RX ADMIN — THERA TABS 1 TABLET: TAB at 11:23

## 2025-04-23 RX ADMIN — CYANOCOBALAMIN TAB 1000 MCG 1000 MCG: 1000 TAB at 11:23

## 2025-04-23 RX ADMIN — PREGABALIN 300 MG: 100 CAPSULE ORAL at 19:31

## 2025-04-23 RX ADMIN — CEFEPIME HYDROCHLORIDE 2 G: 2 INJECTION, POWDER, FOR SOLUTION INTRAVENOUS at 16:15

## 2025-04-23 RX ADMIN — DEXMEDETOMIDINE HYDROCHLORIDE 8 MCG: 100 INJECTION, SOLUTION INTRAVENOUS at 07:38

## 2025-04-23 RX ADMIN — ROPIVACAINE HYDROCHLORIDE 10 ML: 5 INJECTION, SOLUTION EPIDURAL; INFILTRATION; PERINEURAL at 07:18

## 2025-04-23 RX ADMIN — CEFEPIME HYDROCHLORIDE 2 G: 2 INJECTION, POWDER, FOR SOLUTION INTRAVENOUS at 23:47

## 2025-04-23 RX ADMIN — PROPOFOL 150 MCG/KG/MIN: 10 INJECTION, EMULSION INTRAVENOUS at 07:39

## 2025-04-23 RX ADMIN — ASPIRIN 81 MG: 81 TABLET, COATED ORAL at 19:31

## 2025-04-23 RX ADMIN — CEFAZOLIN 2 G: 1 INJECTION, POWDER, FOR SOLUTION INTRAMUSCULAR; INTRAVENOUS at 08:17

## 2025-04-23 RX ADMIN — CALCIUM CARBONATE 750 MG: 750 TABLET, CHEWABLE ORAL at 14:22

## 2025-04-23 RX ADMIN — SODIUM CHLORIDE, SODIUM LACTATE, POTASSIUM CHLORIDE, AND CALCIUM CHLORIDE: .6; .31; .03; .02 INJECTION, SOLUTION INTRAVENOUS at 06:46

## 2025-04-23 RX ADMIN — SENNOSIDES AND DOCUSATE SODIUM 1 TABLET: 50; 8.6 TABLET ORAL at 19:32

## 2025-04-23 RX ADMIN — OXYCODONE HYDROCHLORIDE 5 MG: 5 TABLET ORAL at 23:46

## 2025-04-23 RX ADMIN — ACETAMINOPHEN 975 MG: 325 TABLET ORAL at 11:23

## 2025-04-23 RX ADMIN — MIDAZOLAM HYDROCHLORIDE 2 MG: 1 INJECTION, SOLUTION INTRAMUSCULAR; INTRAVENOUS at 07:09

## 2025-04-23 RX ADMIN — ROPIVACAINE HYDROCHLORIDE 20 ML: 5 INJECTION, SOLUTION EPIDURAL; INFILTRATION; PERINEURAL at 07:15

## 2025-04-23 RX ADMIN — PROPOFOL 80 MG: 10 INJECTION, EMULSION INTRAVENOUS at 07:39

## 2025-04-23 RX ADMIN — IBUPROFEN 600 MG: 200 TABLET, FILM COATED ORAL at 16:13

## 2025-04-23 RX ADMIN — ROPINIROLE HYDROCHLORIDE 1 MG: 1 TABLET, FILM COATED ORAL at 21:08

## 2025-04-23 RX ADMIN — CEFAZOLIN SODIUM 2 G: 2 SOLUTION INTRAVENOUS at 14:23

## 2025-04-23 RX ADMIN — DEXMEDETOMIDINE HYDROCHLORIDE 12 MCG: 100 INJECTION, SOLUTION INTRAVENOUS at 07:43

## 2025-04-23 RX ADMIN — ACETAMINOPHEN 975 MG: 325 TABLET ORAL at 18:28

## 2025-04-23 RX ADMIN — SODIUM CHLORIDE, SODIUM LACTATE, POTASSIUM CHLORIDE, AND CALCIUM CHLORIDE: .6; .31; .03; .02 INJECTION, SOLUTION INTRAVENOUS at 11:24

## 2025-04-23 RX ADMIN — DULOXETINE HYDROCHLORIDE 60 MG: 60 CAPSULE, DELAYED RELEASE PELLETS ORAL at 19:32

## 2025-04-23 RX ADMIN — LIDOCAINE HYDROCHLORIDE 30 MG: 10 INJECTION, SOLUTION INFILTRATION; PERINEURAL at 07:38

## 2025-04-23 RX ADMIN — ONDANSETRON 4 MG: 2 INJECTION INTRAMUSCULAR; INTRAVENOUS at 07:45

## 2025-04-23 ASSESSMENT — ACTIVITIES OF DAILY LIVING (ADL)
ADLS_ACUITY_SCORE: 27
ADLS_ACUITY_SCORE: 20
ADLS_ACUITY_SCORE: 27
ADLS_ACUITY_SCORE: 19
ADLS_ACUITY_SCORE: 31
ADLS_ACUITY_SCORE: 27
ADLS_ACUITY_SCORE: 19
ADLS_ACUITY_SCORE: 27
ADLS_ACUITY_SCORE: 20
ADLS_ACUITY_SCORE: 27
ADLS_ACUITY_SCORE: 31
ADLS_ACUITY_SCORE: 20
ADLS_ACUITY_SCORE: 21
ADLS_ACUITY_SCORE: 20
ADLS_ACUITY_SCORE: 27
ADLS_ACUITY_SCORE: 32

## 2025-04-23 ASSESSMENT — COLUMBIA-SUICIDE SEVERITY RATING SCALE - C-SSRS
6. HAVE YOU EVER DONE ANYTHING, STARTED TO DO ANYTHING, OR PREPARED TO DO ANYTHING TO END YOUR LIFE?: NO
2. HAVE YOU ACTUALLY HAD ANY THOUGHTS OF KILLING YOURSELF IN THE PAST MONTH?: NO
1. IN THE PAST MONTH, HAVE YOU WISHED YOU WERE DEAD OR WISHED YOU COULD GO TO SLEEP AND NOT WAKE UP?: NO

## 2025-04-23 NOTE — CONSULTS
"[facility]  Consult Note - Hospitalist Service  Date of Admission:  4/23/2025  Consult Requested by: dr Luna  Reason for Consult: postop medical management    Assessment & Plan   Kim Berman is a 50 year old female admitted on 4/23/2025. She was admitted by surgeon Dr Luna for osteomyelitis of left tibia, I&D, left great toe sesamoid excision.    Osteomyelitis of left tibia   -4/23: Dr Luna fdid I&D, left great toe sesamoid excision.  -On ancef  -ID consultation  -surgeon is managing    HTN  Hypothyroidism due to Hashimoto's thyroiditis   Alcohol abuse  OA and osteoporosis   H/o seizure  Spinal stenosis  Smoker  -continue PTA meds     Clinically Significant Risk Factors Present on Admission                   # Hypertension: Noted on problem list           # Overweight: Estimated body mass index is 25.45 kg/m  as calculated from the following:    Height as of this encounter: 1.702 m (5' 7\").    Weight as of this encounter: 73.7 kg (162 lb 7.7 oz).              Salud Espinoza MD  Hospitalist Service  Securely message with Vocera (more info)  Text page via [facility] Paging/Directory   ______________________________________________________________________    Chief Complaint   Postop medical management    History is obtained from the patient and electronic health record    History of Present Illness   Kim Berman is a 50 year old female who was admitted by Dr Luna for \"Osteomyelitis of left tibia, I&D, left great toe sesamoid excision.\"    Pt tolerated surgeries and back to floor. No new complaints except postop pain.    Complicated PMH as below.       Past Medical History    Past Medical History:   Diagnosis Date    Alcohol abuse 6/23-6/26/10    History of blood transfusion     Hypertension     Hypokalemia 6/23-6/26/10    Hospitalized @ [facility], and hypomagnesemia    Hypothyroidism due to Hashimoto's thyroiditis     Malnutrition 6/23-6/26/10    Neuropathy     Osteoarthritis of left knee "     Osteomyelitis (H)     Osteoporosis     Pancytopenia -6/26/10    QT prolongation     Seizure (H)     Spinal stenosis of lumbar region without neurogenic claudication     Unspecified essential hypertension     Essential hypertension    Vasculitis        Past Surgical History   Past Surgical History:   Procedure Laterality Date    BIOPSY BONE MARROW 2019       SECTION      Debridement of wound flap closure left foot first metatarsal ulceration, debridement of wound fifth digit left foot with arthroplasty 2023      debridement to muscle,removal soft tissue mass right foot and rotational flap closure ulceration right foot - Right Right 2020      DILATION AND CURETTAGE OF UTERUS 3/1/2024      FIRST TARSAL METATARSAL JOINT ARTHRODESIS RIGHT FOOT, OPEN REDUCTION WITH INTERNAL FIXATION, SECOND METATARSAL RIGHT FOOT, POSSIBLE BONE GRAFTING RIGHT FOOT Right 2018      HYSTEROSCOPY W/ POLYPECTOMY 3/1/2024      IR LUMBAR PUNCTURE  03/15/2024    LEFT FOOT REPEAT INCISION AND DRAINAGE WITH DELAYED PRIMARY CLOSURE Left 10/04/2023      left hip plate Left 2021      OPEN REDUCTION INTERNAL FIXATION LEFT HIP Left 2019      OPEN REDUCTION INTERNAL FIXATION RIGHT HIP Right 2022      removal of hardware right foot first and 2nd metatarsal debridement of bone with biopsy right foot collagen graft to plantar ulceration right foot , placement of antibiotic paste Right 2019      SCS implant 2015  MAPS, no help  SPINAL CORD STIMULATOR REMOVAL 2018       UPPER GI ENDOSCOPY         Medications   Current Facility-Administered Medications   Medication Dose Route Frequency Provider Last Rate Last Admin    acetaminophen (TYLENOL) tablet 975 mg  975 mg Oral Q8H Melissa Hollingsworth PA-C        aspirin EC tablet 81 mg  81 mg Oral BID Melissa Hollingsworth PA-C        benzocaine-menthol (CHLORASEPTIC) 6-10 MG lozenge 1 lozenge  1 lozenge Buccal Q1H PRN Melissa Hollingsworth PA-C         [START ON 4/26/2025] bisacodyl (DULCOLAX) suppository 10 mg  10 mg Rectal Daily PRN Melissa Hollingsworth PA-C        calcium carbonate chew tablet 750 mg  750 mg Oral Daily Salud Espinoza MD        ceFAZolin (ANCEF) 1 g vial to attach to  ml bag for ADULT or 50 ml bag for PEDS  1 g Intravenous Q8H Melissa Hollingsworth PA-C        [START ON 4/24/2025] DULoxetine (CYMBALTA) DR capsule 30 mg  30 mg Oral QAM Salud Espinoza MD        DULoxetine (CYMBALTA) DR capsule 60 mg  60 mg Oral QPM Salud Espinoza MD        HYDROmorphone (DILAUDID) injection 0.2 mg  0.2 mg Intravenous Q2H PRN Melissa Hollingsworth PA-C        Or    HYDROmorphone (DILAUDID) injection 0.4 mg  0.4 mg Intravenous Q2H PRN Melissa Hollingsworth PA-C        hydrOXYzine HCl (ATARAX) tablet 25 mg  25 mg Oral Q6H PRN Melissa Hollingsworth PA-C        ibuprofen (ADVIL/MOTRIN) tablet 600 mg  600 mg Oral Q6H PRN Melissa Hollingsworth PA-C        lactated ringers infusion   Intravenous Continuous Melissa Hollingsworth PA-C        [START ON 4/24/2025] levothyroxine (SYNTHROID/LEVOTHROID) tablet 88 mcg  88 mcg Oral Daily Salud Espinoza MD        lidocaine (LMX4) cream   Topical Q1H PRN Melissa Hollingsworth PA-C        lidocaine 1 % 0.1-1 mL  0.1-1 mL Other Q1H PRN Melissa Hollingsworth PA-C        [START ON 4/25/2025] magnesium hydroxide (MILK OF MAGNESIA) suspension 30 mL  30 mL Oral Daily PRN Melissa Hollingsworth PA-C        methocarbamol (ROBAXIN) tablet 750 mg  750 mg Oral 4x Daily PRN Salud Espinoza MD        multivitamin, therapeutic (THERA-VIT) tablet 1 tablet  1 tablet Oral Daily Salud Espinoza MD        naloxone (NARCAN) injection 0.2 mg  0.2 mg Intravenous Q2 Min PRN Ashley Luna MD        Or    naloxone (NARCAN) injection 0.4 mg  0.4 mg Intravenous Q2 Min PRN Ashley Luna MD        Or    naloxone (NARCAN) injection 0.2 mg  0.2 mg Intramuscular Q2 Min PRN Ashley Luna MD        Or    naloxone (NARCAN) injection 0.4 mg  0.4 mg Intramuscular  Q2 Min PRN Ashley Luna MD        ondansetron (ZOFRAN ODT) ODT tab 4 mg  4 mg Oral Q6H PRN Melissa Hollingsworth PA-C        Or    ondansetron (ZOFRAN) injection 4 mg  4 mg Intravenous Q6H PRN Melissa Hollingsworth PA-C        oxyCODONE (ROXICODONE) tablet 5 mg  5 mg Oral Q4H PRN Melissa Hollingsworth PA-C        Or    oxyCODONE (ROXICODONE) tablet 10 mg  10 mg Oral Q4H PRN Melissa Hollingsworth PA-C        [START ON 4/24/2025] polyethylene glycol (MIRALAX) Packet 17 g  17 g Oral Daily Melissa Hollingsworth PA-C        pregabalin (LYRICA) capsule CAPS 300 mg  300 mg Oral BID Salud Espinoza MD        prochlorperazine (COMPAZINE) injection 10 mg  10 mg Intravenous Q6H PRN Melissa Hollingsworth PA-C        Or    prochlorperazine (COMPAZINE) tablet 10 mg  10 mg Oral Q6H PRN Melissa Hollingsworth PA-C        rOPINIRole (REQUIP) tablet 1 mg  1 mg Oral At Bedtime Salud Espinoza MD        senna-docusate (SENOKOT-S/PERICOLACE) 8.6-50 MG per tablet 1 tablet  1 tablet Oral BID Melissa Hollingsworth PA-C        sodium chloride (PF) 0.9% PF flush 3 mL  3 mL Intracatheter Q8H STEPHANIE Melissa Hollingsworth PA-C        sodium chloride (PF) 0.9% PF flush 3 mL  3 mL Intracatheter q1 min prn Melissa Hollingsworth PA-C        vitamin B-12 (CYANOCOBALAMIN) TABS 1,000 mcg  1,000 mcg Oral Daily Salud Espinoza MD        [START ON 4/24/2025] vitamin D2 (ERGOCALCIFEROL) 97133 units (1250 mcg) capsule 50,000 Units  50,000 Units Oral Once per day on Monday Thursday Salud Espinoza MD              Allergies   Allergies   Allergen Reactions    Ciprofloxacin Other (See Comments)     Flared neuropathy symptoms    Morphine And Codeine Other (See Comments) and Itching     Agitation        Physical Exam   Vital Signs: Temp: 98.7  F (37.1  C) Temp src: Oral BP: 137/65 Pulse: 59   Resp: 18 SpO2: (!) 90 % O2 Device: None (Room air) Oxygen Delivery: 2 LPM  Weight: 162 lbs 7.66 oz    General.  sleepy not in acute distress.  HEENT.  Pupils equal round react to  light, anicteric, EOM intact.  Neck supple no JVD.  CVS regular rhythm no murmur gallops.  Lungs.  Clear to auscultation bilateral no wheezing or rales.  Abdomen.  Soft nontender bowel sounds present.  Extremities.  S/p sx in dressing.  Neurological.  No focal deficit.  Skin no rash. No pallor.       Medical Decision Making       46 MINUTES SPENT BY ME on the date of service doing chart review, history, exam, documentation & further activities per the note.      Data         Imaging results reviewed over the past 24 hrs:   Recent Results (from the past 24 hours)   XR Surgery MARISOL  Fluoro G/T 5 Min    Narrative    EXAM: XR SURGERY MARISOL FLUORO GREATER THAN 5 MIN  LOCATION: [facility]  DATE: 4/23/2025    INDICATION: Left Great Toe Sesmoid Excision, osteomyelitis  COMPARISON: X-ray 4/8/2025    TECHNIQUE: Intraoperative fluoroscopy performed during the patient's procedure.    RADIATION DOSE: Total Air Kerma 0.0774 mGy    FINDINGS: Intraoperative fluoroscopic support was provided for left foot sesamoid bone excision. The images demonstrate a surgical instrument adjacent to the medial sesamoid and subsequent removal. Please refer to the operative report.

## 2025-04-23 NOTE — PROGRESS NOTES
Pt's culture came back postive for 1+ gram negative bacilli and WBC seen.   Dr. Luna messaged by myself and previous RN but not sure if message was seen.   Dr. Hahn also notified and antibiotic was changed to Cefepime.

## 2025-04-23 NOTE — INTERVAL H&P NOTE
"I have reviewed the surgical (or preoperative) H&P that is linked to this encounter, and examined the patient. There are no significant changes    Clinical Conditions Present on Arrival:  Clinically Significant Risk Factors Present on Admission   # Overweight: Estimated body mass index is 25.53 kg/m  as calculated from the following:    Height as of this encounter: 1.702 m (5' 7\").    Weight as of this encounter: 73.9 kg (163 lb).       "

## 2025-04-23 NOTE — OP NOTE
Operative Note    Name:  Kim Berman  PCP:  Dariela Harding Gusman  Procedure Date:  4/23/2025    Pre-Procedure Diagnosis:  Osteomyelitis of left tibia, unspecified type (H) [M86.9]     Post-Procedure Diagnosis:    Same     Procedure: Procedure(s):  LEFT FOOT IRRIGATION AND DEBRIDEMENT WITH  GREAT TOE SESAMOID EXCISION,  GASTROCNEMIUS RELEASE     Surgeon(s):  Melissa Hollingsworth PA-C Rupke, Tracy D, MD    Circulator: Berlin Jones RN  Scrub Person: Bruna Seymour  X-Ray Technologist: Valentine Livingston ARRT   A PAC was necessary to ensure safety of this patient and adequate progression of the procedure.    Anesthesia Type:  Choice with Block     Estimated Blood Loss:   1cc    Complications:    None    Procedure: After being informed of the risks, benefits, and alternatives to the procedure, the patient desired to proceed and was brought to the operating suite where the patient was placed under moderate sedation.  She had received preoperative nerve block for postoperative pain control.  Preoperative antibiotics were held until after tissue and bone cultures were sent.  A tourniquet was applied to the left upper thigh.  The left leg was then prepped and draped in usual sterile fashion.  A timeout was called.      Elevating the left leg onto a padded Ortiz stand.  An incision was marked 1 demonstrates posterior to the posterior medial aspect of the tibia at the musculotendinous junction of the gastroc.  A scalpel was used to incise through skin subcutaneous tissue.  The fascia was divided longitudinally in line with the skin incision.  The interval between the gastroc and soleus musculature was identified and the plane between the tumor was bluntly dissected.  The gastric retractors were then placed on either side of the gastroc tendon and this was divided under direct visualization.  The wound was irrigated copiously with normal saline.  The plantaris tendon was also divided.  The sural nerve was  palpated and the deep tissues and was intact.  A running 3-0 nylon suture was used for skin closure.  Steri-Strips were applied.    I then elevated the tourniquet to 250 mmHg with the knee flexed.  I began by dissecting circumferentially around the plantar wound under the first metatarsal head plantarly.  There was significant overlying bursal tissue.  Some of this tissue was sent for culture.  This carried me directly down onto the tibial sesamoid.  There was some overlying bursal tissue this was resected.  A fluoroscopic image was taken to confirm that this was in fact the tibial sesamoid.  There was significant crumbling of the plantar aspect.  As such I elected to remove the sesamoid based on this finding most of the preoperative MRI imaging suggesting the tibial sesamoid was potentially infected.  I dissected circumferentially around the tibial sesamoid.  This was sent for culture and pathology.  Multiple tissue samples were also sent for culture.  Inspection of the remainder of the first metatarsal appeared to be intact including the fibular sesamoid as well as the first metatarsal head.  There did not seem to be any obvious changes in the remaining of the joint or bone.  There was no obvious purulence.  Final fluoroscopic images were taken to demonstrate complete removal of the tibial sesamoid.  The wound was irrigated copiously with normal saline.  2-0 PDS was used for repair of the flexor hallucis brevis tendon.  2-0 Prolene was used for interrupted closure of the plantar wound.  I was able to loosely close the plantar wound.  A sterile dressing was applied.  Patient was transferred to postanesthesia care unit in stable condition.    Postoperative plan: She will be able to weight-bear as tolerated on her heel avoiding weightbearing on her forefoot.  She is to be in the cam boot until she is seen in 2 weeks for wound check and possible suture removal.  She is to remain in hospital for ID consultation and  ongoing IV antibiotics until we have a more clear picture of whether this aspect is infected or not.           Date: 4/23/2025  Time: 8:40 AM      * No implants in log *

## 2025-04-23 NOTE — ANESTHESIA PROCEDURE NOTES
"Sciatic Procedure Note    Pre-Procedure   Staff -        Anesthesiologist:  Kaiden Donato MD       Performed By: anesthesiologist       Location: pre-op       Pre-Anesthestic Checklist: patient identified, IV checked, site marked, risks and benefits discussed, informed consent, monitors and equipment checked, pre-op evaluation, at physician/surgeon's request and post-op pain management  Timeout:       Correct Patient: Yes        Correct Procedure: Yes        Correct Site: Yes        Correct Position: Yes        Correct Laterality: Yes        Site Marked: Yes  Procedure Documentation  Procedure: Sciatic         Laterality: left       Patient Position: supine       Skin prep: Chloraprep       Local skin infiltrated with mL of 1% lidocaine.  (popliteal approach).       Needle Type: insulated       Needle Gauge: 20.        Needle Length (Inches): 6        1. Ultrasound was used to identify targeted nerve, plexus, vascular marker, or fascial plane and place a needle adjacent to it in real-time.       2. Ultrasound was used to visualize the spread of anesthetic in close proximity to the above referenced structure.       3. A permanent image is entered into the patient's record.       4. The visualized anatomic structures appeared normal.       5. There were no apparent abnormal pathologic findings.    Assessment/Narrative         The placement was negative for: blood aspirated, painful injection and site bleeding       Paresthesias: No.       Bolus given via needle..        Secured via.        Insertion/Infusion Method: Single Shot       Injection made incrementally with aspirations every 5 mL.    Medication(s) Administered   Ropivacaine 0.5% PF (Infiltration) - Infiltration   20 mL - 4/23/2025 7:15:00 AM    FOR [facility] ([facility]) ONLY:   Pain Team Contact information: please page the Pain Team Via [employer]. Search \"Pain\". During daytime hours, please page the attending first. At night please page the resident " first.

## 2025-04-23 NOTE — ANESTHESIA CARE TRANSFER NOTE
Patient: Kim Berman    Procedure: Procedure(s):  LEFT FOOT IRRIGATION AND DEBRIDEMENT WITH  GREAT TOE SESAMOID EXCISION,  GASTROCNEMIUS RELEASE       Diagnosis: Osteomyelitis of left tibia, unspecified type (H) [M86.9]  Diagnosis Additional Information: No value filed.    Anesthesia Type:   MAC     Note:    Oropharynx: oropharynx clear of all foreign objects and spontaneously breathing  Level of Consciousness: awake  Oxygen Supplementation: room air    Independent Airway: airway patency satisfactory and stable  Dentition: dentition unchanged  Vital Signs Stable: post-procedure vital signs reviewed and stable  Report to RN Given: handoff report given  Patient transferred to: Phase II    Handoff Report: Identifed the Patient, Identified the Reponsible Provider, Reviewed the pertinent medical history, Discussed the surgical course, Reviewed Intra-OP anesthesia mangement and issues during anesthesia, Set expectations for post-procedure period and Allowed opportunity for questions and acknowledgement of understanding      Vitals:  Vitals Value Taken Time   /74 04/23/25 0839   Temp 36.6  C (97.8  F) 04/23/25 0838   Pulse 57 04/23/25 0840   Resp 20 04/23/25 0840   SpO2 90 % 04/23/25 0840   Vitals shown include unfiled device data.    Electronically Signed By: TONO Reddy CRNA  April 23, 2025  8:42 AM

## 2025-04-23 NOTE — SUMMARY OF CARE
Patient admitted to room 13 at approximately 10:01am via wheel chair from surgery.    Patient ambulated/transferred:  with one assist. self.    Detailed List of Belongings (be very specific listing out each item):     2 bags with Clothes, phone, shoes, glasses and crutches

## 2025-04-23 NOTE — ANESTHESIA PROCEDURE NOTES
"Adductor canal Procedure Note    Pre-Procedure   Staff -        Anesthesiologist:  Kaiden Donato MD       Performed By: anesthesiologist       Location: pre-op       Pre-Anesthestic Checklist: patient identified, IV checked, site marked, risks and benefits discussed, informed consent, monitors and equipment checked, pre-op evaluation, at physician/surgeon's request and post-op pain management  Timeout:       Correct Patient: Yes        Correct Procedure: Yes        Correct Site: Yes        Correct Position: Yes        Correct Laterality: Yes        Site Marked: Yes  Procedure Documentation  Procedure: Adductor canal         Laterality: left       Patient Position: supine       Skin prep: Chloraprep       Local skin infiltrated with mL of 1% lidocaine.        Needle Type: insulated       Needle Gauge: 20.        Needle Length (Inches): 4        Needle Length (millimeters): 50        1. Ultrasound was used to identify targeted nerve, plexus, vascular marker, or fascial plane and place a needle adjacent to it in real-time.       2. Ultrasound was used to visualize the spread of anesthetic in close proximity to the above referenced structure.       4. The visualized anatomic structures appeared normal.       5. There were no apparent abnormal pathologic findings.    Assessment/Narrative         The placement was negative for: blood aspirated, painful injection and site bleeding       Paresthesias: No.       Bolus given via needle. no blood aspirated via catheter.        Secured via.        Insertion/Infusion Method: Single Shot    Medication(s) Administered   Ropivacaine 0.5% PF (Infiltration) - Infiltration   10 mL - 4/23/2025 7:18:00 AM    FOR [facility] ([facility]) ONLY:   Pain Team Contact information: please page the Pain Team Via [employer]. Search \"Pain\". During daytime hours, please page the attending first. At night please page the resident first.      "

## 2025-04-23 NOTE — CONSULTS
Consultation - INFECTIOUS DISEASE CONSULTATION  Kim Berman ,  1975, MRN [id number]      Osteomyelitis of left tibia, unspecified type (H) [M86.9]  Other acute osteomyelitis of left foot (H) [M86.172]    PCP: Dariela Harding, [phone]   Code status:  Full Code               Assessment:  Left foot osteomyelitis: s/p I&D and 1st toe sesamoid excision. Wound swab  with MSSA. Mildly elevated inflammatory markers at that time. OR cultures pending.   HTN    Active Problems:    Other acute osteomyelitis of left foot (H)    Osteomyelitis (H)        Recommendations:   - cefazolin IV  - follow OR cultures, pathology to guide final antibiotic plan    Ashley Hahn MD  [facility]  Office Telephone [phone].  Fax [phone]  [facility] paging      HPI:    Kim Berman is a 50 year old female. History is provided by patient (limited sleepy post procedure) and chart.  Admitted on  with worsening infection, s/p below procedure.  She is seen post procedure and states she's here due to her back and doesn't know where she had surgery.   Previously seen at [facility]. Culture from 2 weeks ago with MSSA      Procedure: Procedure(s):  LEFT FOOT IRRIGATION AND DEBRIDEMENT WITH  GREAT TOE SESAMOID EXCISION,  GASTROCNEMIUS RELEASE     Chief complaint: Active Problems:    Other acute osteomyelitis of left foot (H)    Osteomyelitis (H)      Medical History  Active Ambulatory Problems     Diagnosis Date Noted    Essential hypertension 2010    Iron deficiency anemia 2010    Tobacco abuse 2010    Alcohol abuse 2010    CARDIOVASCULAR SCREENING; LDL GOAL LESS THAN 130 10/31/2010     Resolved Ambulatory Problems     Diagnosis Date Noted    Muscle weakness (generalized) 2012    Brachial plexus lesion 2012    Motor problems with limbs 2013    Abnormality of gait 2013    Weakness of both legs 2013     Past Medical History:    Diagnosis Date    History of blood transfusion     Hypertension     Hypokalemia -6/26/10    Hypothyroidism due to Hashimoto's thyroiditis     Malnutrition -6/26/10    Neuropathy     Osteoarthritis of left knee     Osteomyelitis (H)     Osteoporosis     Pancytopenia -6/26/10    QT prolongation     Seizure (H)     Spinal stenosis of lumbar region without neurogenic claudication     Unspecified essential hypertension     Vasculitis          Surgical History  She  has a past surgical history that includes IR Lumbar Puncture (03/15/2024); Debridement of wound flap closure left foot first metatarsal ulceration, debridement of wound fifth digit left foot with arthroplasty 2023; BIOPSY BONE MARROW 2019;  section; debridement to muscle,removal soft tissue mass right foot and rotational flap closure ulceration right foot - Right Right 2020; DILATION AND CURETTAGE OF UTERUS 3/1/2024; upper gi endoscopy; FIRST TARSAL METATARSAL JOINT ARTHRODESIS RIGHT FOOT, OPEN REDUCTION WITH INTERNAL FIXATION, SECOND METATARSAL RIGHT FOOT, POSSIBLE BONE GRAFTING RIGHT FOOT Right 2018; HYSTEROSCOPY W/ POLYPECTOMY 3/1/2024; LEFT FOOT REPEAT INCISION AND DRAINAGE WITH DELAYED PRIMARY CLOSURE Left 10/04/2023; left hip plate Left 2021; OPEN REDUCTION INTERNAL FIXATION LEFT HIP Left 2019; OPEN REDUCTION INTERNAL FIXATION RIGHT HIP Right 2022; removal of hardware right foot first and 2nd metatarsal debridement of bone with biopsy right foot collagen graft to plantar ulceration right foot , placement of antibiotic paste Right 2019; and SCS implant   MAPS, no help  SPINAL CORD STIMULATOR REMOVAL 2018 .       Social History  Reviewed, and she  reports that she has been smoking cigarettes. She has never been exposed to tobacco smoke. She has never used smokeless tobacco. She reports that she does not drink alcohol and does not use drugs.        Family History  Reviewed and  noncontributory to present problem, and family history includes Diabetes in her father; Hypertension in her mother.    Psychosocial Needs  Social History     Social History Narrative    Not on file     Additional psychosocial needs reviewed per nursing assessment.       Allergies   Allergen Reactions    Ciprofloxacin Other (See Comments)     Flared neuropathy symptoms    Morphine And Codeine Other (See Comments) and Itching     Agitation      Medications Prior to Admission   Medication Sig Dispense Refill Last Dose/Taking    acetaminophen (TYLENOL 8 HOUR ARTHRITIS PAIN) 650 MG CR tablet Take 1,300 mg by mouth every 8 hours as needed for pain.   4/22/2025 Evening    calcium carbonate 750 MG CHEW Take 750 mg by mouth daily.   4/22/2025 Morning    DULoxetine (CYMBALTA) 30 MG capsule Take 30 mg by mouth every morning.   4/23/2025 Morning    DULoxetine (CYMBALTA) 60 MG capsule Take 60 mg by mouth every evening.   4/22/2025 Evening    levothyroxine (SYNTHROID/LEVOTHROID) 88 MCG tablet Take 88 mcg by mouth daily.   4/23/2025 Morning    methocarbamol (ROBAXIN) 750 MG tablet Take 750 mg by mouth 4 times daily as needed for muscle spasms.   4/22/2025 Evening    multivitamin, therapeutic (THERA-VIT) TABS tablet Take 1 tablet by mouth daily.   Past Week Morning    pregabalin (LYRICA) 300 MG capsule Take 300 mg by mouth 2 times daily.   4/23/2025 Morning    rOPINIRole (REQUIP) 1 MG tablet Take 1 mg by mouth at bedtime.   4/22/2025 Bedtime    vitamin B-12 (CYANOCOBALAMIN) 1000 MCG tablet Take 1,000 mcg by mouth daily.   4/22/2025 Morning    vitamin D2 (ERGOCALCIFEROL) 41414 units (1250 mcg) capsule Take 50,000 Units by mouth twice a week.   4/21/2025 Morning        Review of Systems:  Limited mental status.  Physical Exam:  Temp:  [97.8  F (36.6  C)-98.7  F (37.1  C)] 98.7  F (37.1  C)  Pulse:  [57-74] 59  Resp:  [18-34] 18  BP: (131-182)/(65-99) 137/65  SpO2:  [89 %-100 %] 90 %    GEN: sleepy post procedure, NAD  HEAD:  atraumatic  ENT: moist membranes, no thrush, anicteric sclera  CARDIOVASCULAR: regular rate and rhythm, no murmurs, rubs, or gallops  PULMONARY: lungs clear to ausculation bilaterally  ABDOMEN: soft, nontender, nondistended. Normal bowel sounds  SKIN: no rashes or lesions. No stigma of endocarditis  PSYCH: sleepy  MUSCULOSKELETAL: no synovitis. Post operative dressings.                Pertinent Labs  personally reviewed.   CBC RESULTS:   Recent Labs   Lab Test 04/23/25  1121   PLT 93*        Last Comprehensive Metabolic Panel:  Sodium   Date Value Ref Range Status   02/06/2013 136 133 - 144 mmol/L Final     Potassium   Date Value Ref Range Status   02/06/2013 4.1 3.4 - 5.3 mmol/L Final     Chloride   Date Value Ref Range Status   02/06/2013 100 94 - 109 mmol/L Final     Carbon Dioxide   Date Value Ref Range Status   02/06/2013 26 20 - 32 mmol/L Final     Anion Gap   Date Value Ref Range Status   02/06/2013 10 6 - 17 mmol/L Final     Glucose   Date Value Ref Range Status   02/06/2013 88 60 - 99 mg/dL Final     GLUCOSE BY METER POCT   Date Value Ref Range Status   04/23/2025 94 70 - 99 mg/dL Final     Urea Nitrogen   Date Value Ref Range Status   02/06/2013 16 5 - 24 mg/dL Final     Creatinine   Date Value Ref Range Status   04/23/2025 0.76 0.51 - 0.95 mg/dL Final   02/06/2013 0.67 0.52 - 1.04 mg/dL Final     GFR Estimate   Date Value Ref Range Status   04/23/2025 >90 >60 mL/min/1.73m2 Final     Comment:     eGFR calculated using 2021 CKD-EPI equation.   02/06/2013 >90 >60 mL/min/1.7m2 Final     Calcium   Date Value Ref Range Status   02/06/2013 8.9 8.5 - 10.4 mg/dL Final       CRP Inflammation   Date Value Ref Range Status   06/25/2010 <5.0 0.0 - 8.0 mg/L Final        The following microbiology studies were personally reviewed:  Culture Micro   Date Value Ref Range Status   06/24/2010   Final    No Salmonella, Shigella, Campylobacter, E. coli O157 or Aeromonas isolated   05/06/2010 No Beta Streptococcus isolated   "Final   03/01/2010   Final    No Salmonella, Shigella, Campylobacter or E coli 0157 isolated.       Urine Studies  No lab results found.    Vancomycin Levels  No lab results found.    Invalid input(s): \"VANCO\"    MICROBIOLOGY DATA:  MSSA at [facility].   All cultures:  7-Day Micro Results       Collected Updated Procedure Result Status      04/23/2025 0811 04/23/2025 0836 Gram Stain [[id number]]   Tissue from Tibia, Left    In process Component Value   No component results            04/23/2025 0811 04/23/2025 0836 Fungal or Yeast Culture Routine [[id number]]   Tissue from Tibia, Left    In process Component Value   No component results               04/23/2025 0811 04/23/2025 0836 Tissue Aerobic Bacterial Culture Routine [[id number]]   Tissue from Tibia, Left    In process Component Value   No component results               04/23/2025 0802 04/23/2025 0834 Acid-Fast Bacilli Culture and Stain [[id number]]    Tissue from Toe, Left    In process Component Value   No component results            04/23/2025 0802 04/23/2025 0835 Gram Stain [[id number]]   Tissue from Toe, Left    In process Component Value   No component results            04/23/2025 0802 04/23/2025 0835 Fungal or Yeast Culture Routine [[id number]]   Tissue from Toe, Left    In process Component Value   No component results               04/23/2025 0802 04/23/2025 0835 Tissue Aerobic Bacterial Culture Routine [[id number]]   Tissue from Toe, Left    In process Component Value   No component results               04/23/2025 0802 04/23/2025 0834 Acid-Fast Bacilli Culture and Stain [[id number]]   Tissue from Toe, Left    In process Component Value   No component results            04/23/2025 0801 04/23/2025 0834 Acid-Fast Bacilli Culture and Stain [[id number]]    Tissue from Foot, Left    In process Component Value   No component results            04/23/2025 0801 04/23/2025 0834 Fungal or Yeast Culture Routine [[id number]]   Tissue from Foot, " Left    In process Component Value   No component results               04/23/2025 0801 04/23/2025 0834 Tissue Aerobic Bacterial Culture Routine [[id number]]   Tissue from Foot, Left    In process Component Value   No component results               04/23/2025 0801 04/23/2025 0834 Acid-Fast Bacilli Culture and Stain [[id number]]   Tissue from Foot, Left    In process Component Value   No component results            04/23/2025 0757 04/23/2025 0833 Anaerobic Bacterial Culture Routine [[id number]]   Tissue from Femoral Head, Left    In process Component Value   No component results                        Pertinent Radiology  personally reviewed.     XR Surgery MARISOL  Fluoro G/T 5 Min    Result Date: 4/23/2025  EXAM: XR SURGERY MARISOL FLUORO GREATER THAN 5 MIN LOCATION: [facility] DATE: 4/23/2025 INDICATION: Left Great Toe Sesmoid Excision, osteomyelitis COMPARISON: X-ray 4/8/2025 TECHNIQUE: Intraoperative fluoroscopy performed during the patient's procedure. RADIATION DOSE: Total Air Kerma 0.0774 mGy FINDINGS: Intraoperative fluoroscopic support was provided for left foot sesamoid bone excision. The images demonstrate a surgical instrument adjacent to the medial sesamoid and subsequent removal. Please refer to the operative report.

## 2025-04-23 NOTE — PLAN OF CARE
Problem: Surgery Nonspecified  Goal: Anesthesia/Sedation Recovery  Intervention: Optimize Anesthesia Recovery  Recent Flowsheet Documentation  Taken 4/23/2025 1206 by Yg Tovar RN  Safety Promotion/Fall Prevention:   clutter free environment maintained   activity supervised   nonskid shoes/slippers when out of bed   patient and family education   supervised activity  Goal: Effective Oxygenation and Ventilation  Intervention: Optimize Oxygenation and Ventilation  Recent Flowsheet Documentation  Taken 4/23/2025 1206 by Yg Tovar RN  Activity Management: activity adjusted per tolerance  Head of Bed (HOB) Positioning: HOB at 20-30 degrees     Problem: Pain Acute  Goal: Optimal Pain Control and Function  Outcome: Progressing  Intervention: Prevent or Manage Pain  Recent Flowsheet Documentation  Taken 4/23/2025 1206 by Yg Tovar RN  Medication Review/Management: medications reviewed     Problem: Fall Injury Risk  Goal: Absence of Fall and Fall-Related Injury  Outcome: Progressing  Intervention: Identify and Manage Contributors  Recent Flowsheet Documentation  Taken 4/23/2025 1206 by Yg Tovar RN  Medication Review/Management: medications reviewed  Intervention: Promote Injury-Free Environment  Recent Flowsheet Documentation  Taken 4/23/2025 1206 by Yg Tovar RN  Safety Promotion/Fall Prevention:   clutter free environment maintained   activity supervised   nonskid shoes/slippers when out of bed   patient and family education   supervised activity     Goal Outcome Evaluation: Pt denies any pain. A&Ox4. Vss. Afebrile. Tolerated clears and full liquid diet. Advance to regular. Left Lower Leg dressing CDI. ABX given per protocol.  Pt can make needs be know.

## 2025-04-23 NOTE — ANESTHESIA POSTPROCEDURE EVALUATION
Patient: Kim Berman    Procedure: Procedure(s):  LEFT FOOT IRRIGATION AND DEBRIDEMENT WITH  GREAT TOE SESAMOID EXCISION,  GASTROCNEMIUS RELEASE       Anesthesia Type:  MAC    Note:  Disposition: Outpatient   Postop Pain Control: Uneventful            Sign Out: Well controlled pain   PONV: No   Neuro/Psych: Uneventful            Sign Out: Acceptable/Baseline neuro status   Airway/Respiratory: Uneventful            Sign Out: Acceptable/Baseline resp. status   CV/Hemodynamics: Uneventful            Sign Out: Acceptable CV status; No obvious hypovolemia; No obvious fluid overload   Other NRE: NONE   DID A NON-ROUTINE EVENT OCCUR? No           Last vitals:  Vitals Value Taken Time   /83 04/23/25 0916   Temp 36.6  C (97.8  F) 04/23/25 0838   Pulse 56 04/23/25 0929   Resp 18 04/23/25 0915   SpO2 100 % 04/23/25 0929   Vitals shown include unfiled device data.    Electronically Signed By: Kaiden Donato MD  April 23, 2025  9:31 AM   Problem: PHYSICAL THERAPY ADULT  Goal: Performs mobility at highest level of function for planned discharge setting  See evaluation for individualized goals  Description: Treatment/Interventions: Functional transfer training, LE strengthening/ROM, Elevations, Therapeutic exercise, Cognitive reorientation, Patient/family training, Equipment eval/education, Bed mobility, Gait training, Compensatory technique education, Continued evaluation, Spoke to nursing, Spoke to case management, Spoke to MD, OT, ST  Equipment Recommended: Crystal Santiago (if patient does not own)       See flowsheet documentation for full assessment, interventions and recommendations  Outcome: Not Progressing  Note: Prognosis: Guarded  Problem List: Decreased mobility, Decreased cognition, Decreased skin integrity, Pain  Assessment: Osorio Teresa was seen for a f/u session  Pt continues to present w significant cognitive deficits impacting progress and participation in therapy  She has no observed difficulty performing bed level transf however given current mentation unable to progress to OOB mobility or participate in HEP  Will continue to update POC based on medical status (cognition) and ability to participate in therapy services  Barriers to Discharge: Inaccessible home environment, Decreased caregiver support  Barriers to Discharge Comments: unable to identify at this time  PT Discharge Recommendation: Post acute rehabilitation services    See flowsheet documentation for full assessment

## 2025-04-24 ENCOUNTER — APPOINTMENT (OUTPATIENT)
Dept: PHYSICAL THERAPY | Facility: HOSPITAL | Age: 50
End: 2025-04-24
Attending: PHYSICIAN ASSISTANT
Payer: COMMERCIAL

## 2025-04-24 ENCOUNTER — ENROLLMENT (OUTPATIENT)
Dept: HOME HEALTH SERVICES | Facility: HOME HEALTH | Age: 50
End: 2025-04-24
Payer: COMMERCIAL

## 2025-04-24 VITALS
WEIGHT: 162.48 LBS | OXYGEN SATURATION: 93 % | RESPIRATION RATE: 20 BRPM | HEART RATE: 60 BPM | DIASTOLIC BLOOD PRESSURE: 66 MMHG | BODY MASS INDEX: 25.5 KG/M2 | HEIGHT: 67 IN | TEMPERATURE: 98.1 F | SYSTOLIC BLOOD PRESSURE: 125 MMHG

## 2025-04-24 LAB
ANION GAP SERPL CALCULATED.3IONS-SCNC: 9 MMOL/L (ref 7–15)
BACTERIA TISS BX CULT: ABNORMAL
BACTERIA TISS BX CULT: NORMAL
BUN SERPL-MCNC: 8.4 MG/DL (ref 6–20)
CALCIUM SERPL-MCNC: 9 MG/DL (ref 8.8–10.4)
CHLORIDE SERPL-SCNC: 103 MMOL/L (ref 98–107)
CREAT SERPL-MCNC: 0.76 MG/DL (ref 0.51–0.95)
EGFRCR SERPLBLD CKD-EPI 2021: >90 ML/MIN/1.73M2
GLUCOSE SERPL-MCNC: 89 MG/DL (ref 70–99)
HCO3 SERPL-SCNC: 27 MMOL/L (ref 22–29)
HGB BLD-MCNC: 12.2 G/DL (ref 11.7–15.7)
HOLD SPECIMEN: NORMAL
POTASSIUM SERPL-SCNC: 3.7 MMOL/L (ref 3.4–5.3)
SODIUM SERPL-SCNC: 139 MMOL/L (ref 135–145)

## 2025-04-24 PROCEDURE — 250N000011 HC RX IP 250 OP 636: Performed by: INTERNAL MEDICINE

## 2025-04-24 PROCEDURE — 99232 SBSQ HOSP IP/OBS MODERATE 35: CPT | Performed by: INTERNAL MEDICINE

## 2025-04-24 PROCEDURE — L4360 PNEUMAT WALKING BOOT PRE CST: HCPCS

## 2025-04-24 PROCEDURE — 36415 COLL VENOUS BLD VENIPUNCTURE: CPT | Performed by: PHYSICIAN ASSISTANT

## 2025-04-24 PROCEDURE — 85018 HEMOGLOBIN: CPT | Performed by: PHYSICIAN ASSISTANT

## 2025-04-24 PROCEDURE — 250N000013 HC RX MED GY IP 250 OP 250 PS 637: Performed by: INTERNAL MEDICINE

## 2025-04-24 PROCEDURE — 97161 PT EVAL LOW COMPLEX 20 MIN: CPT | Mod: GP

## 2025-04-24 PROCEDURE — 250N000013 HC RX MED GY IP 250 OP 250 PS 637: Performed by: PHYSICIAN ASSISTANT

## 2025-04-24 PROCEDURE — 80048 BASIC METABOLIC PNL TOTAL CA: CPT | Performed by: FAMILY MEDICINE

## 2025-04-24 PROCEDURE — 99204 OFFICE O/P NEW MOD 45 MIN: CPT | Performed by: FAMILY MEDICINE

## 2025-04-24 PROCEDURE — 258N000003 HC RX IP 258 OP 636: Performed by: PHYSICIAN ASSISTANT

## 2025-04-24 PROCEDURE — 97116 GAIT TRAINING THERAPY: CPT | Mod: GP

## 2025-04-24 PROCEDURE — 250N000013 HC RX MED GY IP 250 OP 250 PS 637: Performed by: FAMILY MEDICINE

## 2025-04-24 RX ORDER — SULFAMETHOXAZOLE AND TRIMETHOPRIM 800; 160 MG/1; MG/1
2 TABLET ORAL 2 TIMES DAILY
Status: DISCONTINUED | OUTPATIENT
Start: 2025-04-24 | End: 2025-04-25 | Stop reason: HOSPADM

## 2025-04-24 RX ORDER — NICOTINE 21 MG/24HR
1 PATCH, TRANSDERMAL 24 HOURS TRANSDERMAL DAILY
Status: DISCONTINUED | OUTPATIENT
Start: 2025-04-24 | End: 2025-04-25 | Stop reason: HOSPADM

## 2025-04-24 RX ORDER — MINOCYCLINE HYDROCHLORIDE 50 MG/1
50 CAPSULE ORAL EVERY 12 HOURS SCHEDULED
Status: DISCONTINUED | OUTPATIENT
Start: 2025-04-24 | End: 2025-04-25 | Stop reason: HOSPADM

## 2025-04-24 RX ADMIN — OXYCODONE HYDROCHLORIDE 10 MG: 5 TABLET ORAL at 22:09

## 2025-04-24 RX ADMIN — PREGABALIN 300 MG: 100 CAPSULE ORAL at 19:38

## 2025-04-24 RX ADMIN — LEVOTHYROXINE SODIUM 88 MCG: 88 TABLET ORAL at 08:12

## 2025-04-24 RX ADMIN — ERGOCALCIFEROL 50000 UNITS: 1.25 CAPSULE, LIQUID FILLED ORAL at 08:12

## 2025-04-24 RX ADMIN — ACETAMINOPHEN 975 MG: 325 TABLET ORAL at 02:04

## 2025-04-24 RX ADMIN — DULOXETINE HYDROCHLORIDE 30 MG: 30 CAPSULE, DELAYED RELEASE ORAL at 08:12

## 2025-04-24 RX ADMIN — OXYCODONE HYDROCHLORIDE 5 MG: 5 TABLET ORAL at 13:22

## 2025-04-24 RX ADMIN — CYANOCOBALAMIN TAB 1000 MCG 1000 MCG: 1000 TAB at 08:12

## 2025-04-24 RX ADMIN — OXYCODONE HYDROCHLORIDE 10 MG: 5 TABLET ORAL at 18:09

## 2025-04-24 RX ADMIN — SULFAMETHOXAZOLE AND TRIMETHOPRIM 2 TABLET: 800; 160 TABLET ORAL at 19:38

## 2025-04-24 RX ADMIN — OXYCODONE HYDROCHLORIDE 5 MG: 5 TABLET ORAL at 04:26

## 2025-04-24 RX ADMIN — ROPINIROLE HYDROCHLORIDE 1 MG: 1 TABLET, FILM COATED ORAL at 22:09

## 2025-04-24 RX ADMIN — NICOTINE 1 PATCH: 21 PATCH, EXTENDED RELEASE TRANSDERMAL at 14:40

## 2025-04-24 RX ADMIN — MINOCYCLINE HYDROCHLORIDE 50 MG: 50 CAPSULE ORAL at 19:38

## 2025-04-24 RX ADMIN — PREGABALIN 300 MG: 100 CAPSULE ORAL at 08:11

## 2025-04-24 RX ADMIN — ACETAMINOPHEN 975 MG: 325 TABLET ORAL at 18:09

## 2025-04-24 RX ADMIN — IBUPROFEN 600 MG: 200 TABLET, FILM COATED ORAL at 08:12

## 2025-04-24 RX ADMIN — METHOCARBAMOL 750 MG: 750 TABLET ORAL at 08:12

## 2025-04-24 RX ADMIN — SENNOSIDES AND DOCUSATE SODIUM 1 TABLET: 50; 8.6 TABLET ORAL at 19:38

## 2025-04-24 RX ADMIN — METHOCARBAMOL 750 MG: 750 TABLET ORAL at 15:54

## 2025-04-24 RX ADMIN — ASPIRIN 81 MG: 81 TABLET, COATED ORAL at 19:38

## 2025-04-24 RX ADMIN — CEFEPIME HYDROCHLORIDE 2 G: 2 INJECTION, POWDER, FOR SOLUTION INTRAVENOUS at 08:11

## 2025-04-24 RX ADMIN — THERA TABS 1 TABLET: TAB at 08:12

## 2025-04-24 RX ADMIN — ASPIRIN 81 MG: 81 TABLET, COATED ORAL at 08:12

## 2025-04-24 RX ADMIN — DULOXETINE HYDROCHLORIDE 60 MG: 60 CAPSULE, DELAYED RELEASE PELLETS ORAL at 19:38

## 2025-04-24 RX ADMIN — CALCIUM CARBONATE 750 MG: 750 TABLET, CHEWABLE ORAL at 08:12

## 2025-04-24 RX ADMIN — ACETAMINOPHEN 975 MG: 325 TABLET ORAL at 10:42

## 2025-04-24 RX ADMIN — SODIUM CHLORIDE, SODIUM LACTATE, POTASSIUM CHLORIDE, AND CALCIUM CHLORIDE: .6; .31; .03; .02 INJECTION, SOLUTION INTRAVENOUS at 04:31

## 2025-04-24 ASSESSMENT — ACTIVITIES OF DAILY LIVING (ADL)
ADLS_ACUITY_SCORE: 31
DEPENDENT_IADLS:: INDEPENDENT
ADLS_ACUITY_SCORE: 30
ADLS_ACUITY_SCORE: 31
ADLS_ACUITY_SCORE: 30
ADLS_ACUITY_SCORE: 31
ADLS_ACUITY_SCORE: 30
ADLS_ACUITY_SCORE: 31

## 2025-04-24 NOTE — PLAN OF CARE
"PRIMARY DIAGNOSIS: \"GENERIC\" NURSING  OUTPATIENT/OBSERVATION GOALS TO BE MET BEFORE DISCHARGE:  ADLs back to baseline: No    Activity and level of assistance: Crutches    Pain status: Improved-controlled with oral pain medications.    Return to near baseline physical activity: No     Discharge Planner Nurse   Safe discharge environment identified: No  Barriers to discharge: Yes       Entered by: Silvio Vargas RN 04/24/2025 12:59 PM  Pt VSS, on RA, A/Ox4. C/o cramping pain this AM and headache, scheduled tylenol and PRN ibuprofen given with relief. LLE wrapped with dressing and ace wraps, CMS intact. Utilizing crutches independently. Orthotics saw pt and fitted for medium CAM boot.   Please review provider order for any additional goals.   Nurse to notify provider when observation goals have been met and patient is ready for discharge.  "

## 2025-04-24 NOTE — PROGRESS NOTES
"INFECTIOUS DISEASE FOLLOW UP NOTE    Date: 2025   CHIEF COMPLAINT: No chief complaint on file.       ASSESSMENT:    Left foot osteomyelitis: s/p I&D and 1st toe sesamoid excision. Wound swab  with MSSA. Mildly elevated inflammatory markers at that time. OR cultures are growing stenotrophomonas.   HTN    PLAN:  - OR cultures just back with stenotrophomonas.   - switch empiric abx to bactrim plus minocycline for the steno. Will also cover the prior MSSA  - follow up susceptibilities on steno for final abx plan  - bmp in am    Ashley Hahn MD  [facility]   Office Telephone [phone].  Fax [phone]  [employer] paging    ______________________________________________________________________    SUBJECTIVE / INTERVAL HISTORY:  Seen earlier, feeling ok. Tolerating antibiotics. Reports prior infections in this area that resolved for a couple of years then recurred. After visit, cultures came back with stenotrophomonas.     ROS: All other systems negative except as listed above.    SH/FH/Habits/PMH reviewed and unchanged.    OBJECTIVE:  /67 (BP Location: Left arm)   Pulse 64   Temp 98.1  F (36.7  C) (Oral)   Resp 17   Ht 1.702 m (5' 7\")   Wt 73.7 kg (162 lb 7.7 oz)   LMP  (LMP Unknown)   SpO2 (!) 89%   BMI 25.45 kg/m       Resp: 17    Vital Signs  Temp: 98.1  F (36.7  C)  Temp src: Oral  Resp: 17  Pulse: 64  Pulse Rate Source: Monitor  BP: 132/67  BP Location: Left arm    Temp (24hrs), Av.1  F (36.7  C), Min:97.7  F (36.5  C), Max:98.4  F (36.9  C)      GEN: No acute distress.    RESPIRATORY:  Normal breathing pattern.   EXTREMITIES: No edema.  SKIN/HAIR/NAILS:  No rashes. Foot wraps.   IV:  peripheral IV      Antibiotics:  cefepime    Pertinent labs:  CRP Inflammation   Date Value Ref Range Status   2010 <5.0 0.0 - 8.0 mg/L Final      CBC RESULTS:   Recent Labs   Lab Test 25  0641 25  1121   HGB 12.2  --    PLT  --  93*      Last Comprehensive " Metabolic Panel:  Sodium   Date Value Ref Range Status   04/24/2025 139 135 - 145 mmol/L Final   02/06/2013 136 133 - 144 mmol/L Final     Potassium   Date Value Ref Range Status   04/24/2025 3.7 3.4 - 5.3 mmol/L Final   02/06/2013 4.1 3.4 - 5.3 mmol/L Final     Chloride   Date Value Ref Range Status   04/24/2025 103 98 - 107 mmol/L Final   02/06/2013 100 94 - 109 mmol/L Final     Carbon Dioxide   Date Value Ref Range Status   02/06/2013 26 20 - 32 mmol/L Final     Carbon Dioxide (CO2)   Date Value Ref Range Status   04/24/2025 27 22 - 29 mmol/L Final     Anion Gap   Date Value Ref Range Status   04/24/2025 9 7 - 15 mmol/L Final   02/06/2013 10 6 - 17 mmol/L Final     Glucose   Date Value Ref Range Status   04/24/2025 89 70 - 99 mg/dL Final   02/06/2013 88 60 - 99 mg/dL Final     GLUCOSE BY METER POCT   Date Value Ref Range Status   04/23/2025 94 70 - 99 mg/dL Final     Urea Nitrogen   Date Value Ref Range Status   04/24/2025 8.4 6.0 - 20.0 mg/dL Final   02/06/2013 16 5 - 24 mg/dL Final     Creatinine   Date Value Ref Range Status   04/24/2025 0.76 0.51 - 0.95 mg/dL Final   02/06/2013 0.67 0.52 - 1.04 mg/dL Final     GFR Estimate   Date Value Ref Range Status   04/24/2025 >90 >60 mL/min/1.73m2 Final     Comment:     eGFR calculated using 2021 CKD-EPI equation.   02/06/2013 >90 >60 mL/min/1.7m2 Final     Calcium   Date Value Ref Range Status   04/24/2025 9.0 8.8 - 10.4 mg/dL Final   02/06/2013 8.9 8.5 - 10.4 mg/dL Final        MICROBIOLOGY DATA:  Personally reviewed.  7-Day Micro Results       Collected Updated Procedure Result Status      04/23/2025 0811 04/23/2025 1210 Acid-Fast Bacilli Culture and Stain [[id number]]    Tissue from Tibia, Left    In process Component Value   No component results            04/23/2025 0811 04/23/2025 1338 Gram Stain [[id number]]   (Abnormal)   Tissue from Tibia, Left    Final result Component Value   GS Culture See corresponding culture for results   Gram Stain Result 1+ Gram  negative bacilli   Gram Stain Result 1+ WBC seen            04/23/2025 0811 04/24/2025 1301 Fungal or Yeast Culture Routine [[id number]]   Tissue from Tibia, Left    Preliminary result Component Value   Culture No growth after 1 day  [P]                04/23/2025 0811 04/24/2025 1121 Tissue Aerobic Bacterial Culture Routine [[id number]]   Tissue from Tibia, Left    Preliminary result Component Value   Culture Culture in progress  [P]                04/23/2025 0811 04/23/2025 1210 Acid-Fast Bacilli Culture and Stain [[id number]]   Tissue from Tibia, Left    In process Component Value   No component results            04/23/2025 0802 04/23/2025 0834 Acid-Fast Bacilli Culture and Stain [[id number]]    Tissue from Toe, Left    In process Component Value   No component results            04/23/2025 0802 04/23/2025 1343 Gram Stain [[id number]]   (Abnormal)   Tissue from Toe, Left    Final result Component Value   GS Culture See corresponding culture for results   Gram Stain Result 1+ Gram negative bacilli   Gram Stain Result 1+ WBC seen            04/23/2025 0802 04/24/2025 1246 Fungal or Yeast Culture Routine [[id number]]   Tissue from Toe, Left    Preliminary result Component Value   Culture No growth after 1 day  [P]                04/23/2025 0802 04/24/2025 1259 Tissue Aerobic Bacterial Culture Routine [[id number]]   (Abnormal)   Tissue from Toe, Left    Preliminary result Component Value   Culture Culture in progress  [P]     1+ Stenotrophomonas maltophilia  [P]     Identification is preliminary, confirmation in progress               04/23/2025 0802 04/23/2025 0834 Acid-Fast Bacilli Culture and Stain [[id number]]   Tissue from Toe, Left    In process Component Value   No component results            04/23/2025 0801 04/23/2025 0834 Acid-Fast Bacilli Culture and Stain [[id number]]    Tissue from Foot, Left    In process Component Value   No component results            04/23/2025 0801 04/24/2025 1231  Fungal or Yeast Culture Routine [[id number]]   Tissue from Foot, Left    Preliminary result Component Value   Culture No growth after 1 day  [P]                04/23/2025 0801 04/24/2025 1256 Tissue Aerobic Bacterial Culture Routine [[id number]]   (Abnormal)   Tissue from Foot, Left    Preliminary result Component Value   Culture Culture in progress  [P]     1+ Stenotrophomonas maltophilia  [P]     Identification is preliminary, confirmation in progress               04/23/2025 0801 04/23/2025 0834 Acid-Fast Bacilli Culture and Stain [[id number]]   Tissue from Foot, Left    In process Component Value   No component results            04/23/2025 0757 04/24/2025 1207 Anaerobic Bacterial Culture Routine [[id number]]   Tissue from Femoral Head, Left    Preliminary result Component Value   Culture No anaerobic organisms isolated after 1 day  [P]                         RADIOLOGY:  Personally Reviewed.  No results found for this or any previous visit (from the past 24 hours).    Active Problems:    Other acute osteomyelitis of left foot (H)    Osteomyelitis (H)

## 2025-04-24 NOTE — PROGRESS NOTES
Kim was seen for fit and delivery of a left Tall cam boot per order of her physician. Her mood today was pleasant.     A size medium cam boot was chosen for optimal fit and she handled the fitting process with no difficulties. The inner liner was donned and the external frame was locked in position to maintain neutral ankle alignment.     The goal of the orthosis is to provide tri-planar support and immobilization of the foot and ankle to reduce pain and promote healing.     Instructions on use and application was given to her along with a card for contact after discharge.     I plan to see her PRN.     Electronically signed by Rakesh Scott CPO, JULIO  [place] [facility]  [phone]

## 2025-04-24 NOTE — PLAN OF CARE
Problem: Adult Inpatient Plan of Care  Goal: Absence of Hospital-Acquired Illness or Injury  Intervention: Prevent Skin Injury  Recent Flowsheet Documentation  Taken 4/23/2025 1600 by Loli Sewell, RN  Body Position:   position changed independently   supine, head elevated     Problem: Surgery Nonspecified  Goal: Effective Bowel Elimination  Outcome: Progressing     Problem: Pain Acute  Goal: Optimal Pain Control and Function  Outcome: Progressing  Intervention: Develop Pain Management Plan  Recent Flowsheet Documentation  Taken 4/23/2025 1613 by Loli Sewell, RN  Pain Management Interventions: medication (see MAR)   Goal Outcome Evaluation:       Pt AxO4, VSS but BP was elevated, RA. Pt rating pain 4/10, given 600mg ibuprofen and 975 tylenol with effect. Pt regaining sensation and movement in leg and ankle. LLE wrapped and UTV incisions. Cultures came back positive for 1+ gram negative bacilli and WBC, ID and surgery aware. Cefepime infusion completed. Pt tolerating regular diet. Pt SBA w/ crutches to commode. Pt has LLE elevated with 2 pillows.

## 2025-04-24 NOTE — PLAN OF CARE
"PRIMARY DIAGNOSIS: \"GENERIC\" NURSING  OUTPATIENT/OBSERVATION GOALS TO BE MET BEFORE DISCHARGE:  ADLs back to baseline: No    Activity and level of assistance: Crutches    Pain status: Improved-controlled with oral pain medications.    Return to near baseline physical activity: No     Discharge Planner Nurse   Safe discharge environment identified: No  Barriers to discharge: Yes       Entered by: Silvio Vargas RN 04/24/2025 12:58 PM     Please review provider order for any additional goals.   Nurse to notify provider when observation goals have been met and patient is ready for discharge.  "

## 2025-04-24 NOTE — PROGRESS NOTES
"Orthopedic Progress Note      Assessment: 1 Day Post-Op  S/P Procedure(s):  LEFT FOOT IRRIGATION AND DEBRIDEMENT WITH  GREAT TOE SESAMOID EXCISION,  GASTROCNEMIUS RELEASE     Plan:   - Continue PT/OT  - Weightbearing status: WBAT left lower extremity through the heel with Cam boot on  -Cam boot to be worn at all times besides skin checks and hygiene cares  - Activity: Up with assist and assistive device until independent.  - Anticoagulation: ASA 81 PO BID in addition to SCDs, steve stockings and early ambulation.  - Antibiotics: Currently cefepime, infectious disease managing  -Cultures pending.  Gram stain showing gram-negative bacilli  - Pain Management; continue current regimen  - Diet: progress diet as tolerated  - Labs: hgb 12.2, transfuse if <7.0. No indication today  - Dressing: Keep dry and intact  - Elevation: Elevate left lower extremity on pillow to keep above the level of the heart as much as possible  - Follow-up: Outpatient follow up in 2 weeks  - Disposition: Anticipate discharge home with assist pending antibiotic plan, medical stability, clinical improvement.  No plan for discharge yet      Subjective:  Pain: moderate  Nausea, Vomiting:  No  Lightheadedness, Dizziness:  No  Neuro:  Patient denies new onset numbness or paresthesias  Fever, chills: No  Chest pain: No  SOB: No    Patient reports feeling well today. Patient reports pain is tolerable with current pain regimen. Patient eating and drinking well. Patient voiding and passing gas however no BM. All questions/concerns answered.      Objective:  /66 (BP Location: Left arm)   Pulse 65   Temp 98.2  F (36.8  C) (Oral)   Resp 16   Ht 1.702 m (5' 7\")   Wt 73.7 kg (162 lb 7.7 oz)   LMP  (LMP Unknown)   SpO2 94%   BMI 25.45 kg/m      The patient is A&Ox3. Appears comfortable, sitting up in bed  Calves without tenderness, neg Sandra's  Brisk capillary refill in the toes.    Left foot warm & well-perfused.  Sensation is intact to light touch " & equal bilaterally in the femoral, DP, SP & tibial nerve distributions.  ROM: Appropriately flexes & extends all toes bilaterally.    Leg lengths equal.  Dressing C/D/I without strikethrough, no surrounding erythema.      No drain     Pertinent Labs   Lab Results: personally reviewed.   Lab Results   Component Value Date    INR 1.05 06/23/2010    INR 0.95 03/03/2010    INR 1.07 03/01/2010     Lab Results   Component Value Date    WBC 3.7 (L) 06/24/2010    HGB 12.2 04/24/2025    HCT 26.6 (L) 06/24/2010     (H) 06/24/2010    PLT 93 (L) 04/23/2025     Lab Results   Component Value Date     04/24/2025    CO2 27 04/24/2025         Report completed by:  MAGGY CALDWELL PA-C  Date: 04/24/2025  [facility]

## 2025-04-24 NOTE — PROGRESS NOTES
"[facility]    Medicine Progress Note - Hospitalist Service    Date of Admission:  4/23/2025    Assessment & Plan   Kim Berman is a 50 year old female with hx idiopathic neuropathy admitted by surgeon Dr Luna for osteomyelitis of left tibia.  S/p  I&D, left great toe sesamoid excision.    Osteomyelitis of left tibia   -POD #1 s/p I&D, left great toe sesamoid excision.  - Was on on ancef; Changed to cefepime; today changed to Bactrim plus minocycline  ---Wound swab MSSA  ---OR culture stenotrophomonas  -ID following  -Activity SBA with crutches  --PT ordered  --Elevating left lower extremity  -[place] [facility] following    HTN  --- Blood pressure stable here   ---check bmp  --- Not on any home blood pressure meds    Hypothyroidism due to Hashimoto's thyroiditis   --- Continue home Synthroid    RLS - home requip    Peripheral neuropathy  --- States was diagnosed through neurology years ago and now follows with primary care  --- Continue Lyrica and Cymbalta    Tobacco use  -reordered nicotine patch          Diet: Regular Diet Adult    DVT Prophylaxis: Defer to primary service  Baig Catheter: Not present  Lines: None     Cardiac Monitoring: None  Code Status: Full Code      Clinically Significant Risk Factors Present on Admission                 # Thrombocytopenia: Lowest platelets = 93 in last 2 days, will monitor for bleeding   # Hypertension: Noted on problem list           # Overweight: Estimated body mass index is 25.45 kg/m  as calculated from the following:    Height as of this encounter: 1.702 m (5' 7\").    Weight as of this encounter: 73.7 kg (162 lb 7.7 oz).              Social Drivers of Health    Tobacco Use: High Risk (4/23/2025)    Patient History     Smoking Tobacco Use: Every Day     Smokeless Tobacco Use: Never     Passive Exposure: Never   Physical Activity: Inactive (1/17/2022)    Received from [facility]    Exercise Vital Sign     Days of Exercise per Week: 0 days     " Minutes of Exercise per Session: 0 min          Disposition Plan     Medically Ready for Discharge: Anticipated Tomorrow             Debi Fierro MD  Hospitalist Service  [facility]  Securely message with mohchi (more info)  Text page via Line[employer] Paging/Directory   ______________________________________________________________________    Interval History   --- Patient seen and chart reviewed  --- Endorses pain in her foot with Cam boot on  --- She has had peripheral neuropathy described as idiopathic for the past 10 years which is why she uses Cymbalta and Lyrica.  --- Smokes at least 1/2 pack/day and uses nicotine patch.  --- No nausea or vomiting.  Denies constipation or diarrhea    Physical Exam   Vital Signs: Temp: 98.2  F (36.8  C) Temp src: Oral BP: 126/66 Pulse: 65   Resp: 16 SpO2: 94 % O2 Device: None (Room air) Oxygen Delivery: 2 LPM  Weight: 162 lbs 7.66 oz    General Appearance: Pleasant female no apparent distress  Respiratory: Clear to auscultation bilaterally  Cardiovascular: regular rate and rhythm  GI: soft, nontender, no hsm or masses  Skin: cam oot on left leg - incision not examined - no RLE edema  Other: Neuro intact, no focal deficits appeciated     Medical Decision Making             Data     I have personally reviewed the following data over the past 24 hrs:    N/A  \   12.2   / N/A     139 103 8.4 /  89   3.7 27 0.76 \       Imaging results reviewed over the past 24 hrs:   No results found for this or any previous visit (from the past 24 hours).

## 2025-04-24 NOTE — PLAN OF CARE
"  Problem: Adult Inpatient Plan of Care  Goal: Plan of Care Review  Description: The Plan of Care Review/Shift note should be completed every shift.  The Outcome Evaluation is a brief statement about your assessment that the patient is improving, declining, or no change.  This information will be displayed automatically on your shiftnote.  Outcome: Progressing  Goal: Patient-Specific Goal (Individualized)  Description: You can add care plan individualizations to a care plan. Examples of Individualization might be:  \"Parent requests to be called daily at 9am for status\", \"I have a hard time hearing out of my right ear\", or \"Do not touch me to wake me up as it startlesme\".  Outcome: Progressing  Goal: Absence of Hospital-Acquired Illness or Injury  Outcome: Progressing  Goal: Optimal Comfort and Wellbeing  Outcome: Progressing  Intervention: Monitor Pain and Promote Comfort  Recent Flowsheet Documentation  Taken 4/24/2025 0028 by Janice Juan, RN  Pain Management Interventions: medication (see MAR)  Taken 4/23/2025 2346 by Janice Juan, RN  Pain Management Interventions: medication (see MAR)  Goal: Readiness for Transition of Care  Outcome: Progressing     Problem: Surgery Nonspecified  Goal: Absence of Bleeding  Outcome: Progressing  Goal: Effective Bowel Elimination  Outcome: Progressing  Goal: Fluid and Electrolyte Balance  Outcome: Progressing  Goal: Blood Glucose Level Within Targeted Range  Outcome: Progressing  Goal: Absence of Infection Signs and Symptoms  Outcome: Progressing  Goal: Anesthesia/Sedation Recovery  Outcome: Progressing  Goal: Optimal Pain Control and Function  Outcome: Progressing  Intervention: Prevent or Manage Pain  Recent Flowsheet Documentation  Taken 4/24/2025 0028 by Janice Juan, RN  Pain Management Interventions: medication (see MAR)  Taken 4/23/2025 2346 by Janice Juan, RN  Pain Management Interventions: medication (see MAR)  Goal: Nausea and Vomiting " Relief  Outcome: Progressing  Goal: Effective Urinary Elimination  Outcome: Progressing  Goal: Effective Oxygenation and Ventilation  Outcome: Progressing     Problem: Pain Acute  Goal: Optimal Pain Control and Function  Outcome: Progressing  Intervention: Develop Pain Management Plan  Recent Flowsheet Documentation  Taken 4/24/2025 0028 by Janice Juan, RN  Pain Management Interventions: medication (see MAR)  Taken 4/23/2025 2346 by Janice Juan, RN  Pain Management Interventions: medication (see MAR)     Problem: Fall Injury Risk  Goal: Absence of Fall and Fall-Related Injury  Outcome: Progressing   Goal Outcome Evaluation:  PT AxO x 5 R.A.Patient c/o pain 7/10 on the left foot radiating upward upper body prn oxycodone 5 mg was given by relief.Patient was able to wiggle her legs good circulation noted.Patient vitals.

## 2025-04-24 NOTE — CONSULTS
Care Management Initial Consult    General Information  Assessment completed with: Patient, Patient  Type of CM/SW Visit: Initial Assessment    Primary Care Provider verified and updated as needed: Yes   Readmission within the last 30 days: no previous admission in last 30 days      Reason for Consult: discharge planning  Advance Care Planning: Advance Care Planning Reviewed: verified with patient        Communication Assessment  Patient's communication style: spoken language (English or Bilingual)    Hearing Difficulty or Deaf: no   Wear Glasses or Blind: yes    Cognitive  Cognitive/Neuro/Behavioral: WDL  Level of Consciousness: alert  Arousal Level: opens eyes spontaneously  Orientation: oriented x 4  Mood/Behavior: calm, cooperative  Best Language: 0 - No aphasia  Speech: clear    Living Environment:   People in home: parent(s)  Shahriar  Current living Arrangements: house      Able to return to prior arrangements: yes     Family/Social Support:  Care provided by: self  Provides care for: no one  Marital Status:   Support system: Parent(s)          Description of Support System: Supportive    Support Assessment: Adequate family and caregiver support    Current Resources:   Patient receiving home care services: No     Community Resources: None  Equipment currently used at home: none  Supplies currently used at home: None    Employment/Financial:  Employment Status: employed part-time, other (see comments) (Currently on leave of absence)        Financial Concerns: none   Referral to Financial Worker: No     Does the patient's insurance plan have a 3 day qualifying hospital stay waiver?  No    Lifestyle & Psychosocial Needs:  Social Drivers of Health     Food Insecurity: Low Risk  (4/23/2025)    Food Insecurity     Within the past 12 months, did you worry that your food would run out before you got money to buy more?: No     Within the past 12 months, did the food you bought just not last and you didn t have  money to get more?: No   Depression: Not at risk (10/11/2024)    Received from [facility] [facility]    PHQ-2     PHQ-2 TOTAL SCORE: 0   Housing Stability: Low Risk  (4/23/2025)    Housing Stability     Do you have housing? : Yes     Are you worried about losing your housing?: No   Tobacco Use: High Risk (4/23/2025)    Patient History     Smoking Tobacco Use: Every Day     Smokeless Tobacco Use: Never     Passive Exposure: Never   Financial Resource Strain: Low Risk  (4/23/2025)    Financial Resource Strain     Within the past 12 months, have you or your family members you live with been unable to get utilities (heat, electricity) when it was really needed?: No   Alcohol Use: Not At Risk (1/17/2022)    Received from [facility]    AUDIT-C     Frequency of Alcohol Consumption: Monthly or less     Average Number of Drinks: 1 or 2     Frequency of Binge Drinking: Never   Transportation Needs: Low Risk  (4/23/2025)    Transportation Needs     Within the past 12 months, has lack of transportation kept you from medical appointments, getting your medicines, non-medical meetings or appointments, work, or from getting things that you need?: No   Physical Activity: Inactive (1/17/2022)    Received from [facility]    Exercise Vital Sign     Days of Exercise per Week: 0 days     Minutes of Exercise per Session: 0 min   Interpersonal Safety: Low Risk  (4/23/2025)    Interpersonal Safety     Do you feel physically and emotionally safe where you currently live?: Yes     Within the past 12 months, have you been hit, slapped, kicked or otherwise physically hurt by someone?: No     Within the past 12 months, have you been humiliated or emotionally abused in other ways by your partner or ex-partner?: No   Stress: No Stress Concern Present (1/17/2022)    Received from [facility]    Citizen of the [place] of Occupational Health - Occupational Stress Questionnaire     Feeling of Stress : Only a little   Social  "Connections: Socially Integrated (12/3/2024)    Received from [facility]    Social Connections     Do you often feel lonely or isolated from those around you?: 0   Health Literacy: Not on file     Functional Status:  Prior to admission patient needed assistance:   Dependent ADLs:: Independent  Dependent IADLs:: Independent     Discussed  Partnership in Safe Discharge Planning  document with patient/family: No    Additional Information:  Rec'd a CM/SW IP Consult: Discharge Planning/Disposition - Possible need for IV antibiotics     Writer met with patient at bedside to review role of care management services, discuss goals of care and assess need for any possible services at discharge. Patient alert, answering questions appropriately and engaged in the conversation. Address, phone number and PCP confirmed. Patient reported no HCD. Lives in the basement of her parent's house. Report baseline, she is independent with ADLs/IADLs, but currently, she will need assist due to Weightbearing status: WBAT left lower extremity through the heel with Cam boot on. Patient is waiting to get the Cam boot. Still drives. Reported no DME and no community resources. 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 Goal is to return home, \"I'm waiting to get the PICC line place.\" Anticipate family will transport.     Referral sent o [facility] for the home IV benefit check.    Next Steps: RNCM to follow for medical progression, recommendations, and final discharge plan.     Giulia Liu RN     "

## 2025-04-24 NOTE — PROGRESS NOTES
[facility]                                                                                   OUTPATIENT PHYSICAL THERAPY    PLAN OF TREATMENT FOR OUTPATIENT REHABILITATION   Patient's Last Name, First Name, Kim Chakraborty YOB: 1975   Provider's Name   [facility]   Medical Record No.  [id number]     Onset Date: 04/23/25 Start of Care Date: 04/24/25     Medical Diagnosis:  Osteomyelitis of left tibia               PT Diagnosis:  Impaired functional mobility Certification Dates:  From: 04/24/25  To: 04/24/25       See note for plan of treatment, functional goals, and certification details.    I CERTIFY THE NEED FOR THESE SERVICES FURNISHED UNDER        THIS PLAN OF TREATMENT AND WHILE UNDER MY CARE (Physician co-signature of this document indicates review and certification of the therapy plan).                04/24/25 0818   Appointment Info   Signing Clinician's Name / Credentials (PT) Chelsey Morales, PT, DPT   Quick Adds   Quick Adds Certification   Living Environment   People in Home parent(s)   Current Living Arrangements house   Home Accessibility stairs within home   Number of Stairs, Within Home, Primary six;seven   Stair Railings, Within Home, Primary railings safe and in good condition   Transportation Anticipated family or friend will provide   Living Environment Comments Patient lives on lower level with 6 + 7 stairs to access.   Self-Care   Usual Activity Tolerance good   Current Activity Tolerance moderate   Equipment Currently Used at Home none   Fall history within last six months no   Activity/Exercise/Self-Care Comment Patient owns crutches and a FWW.   General Information   Onset of Illness/Injury or Date of Surgery 04/23/25   Referring Physician Melissa Hollingsworth PA-C   Patient/Family Therapy Goals Statement (PT) Return home   Pertinent History of Current Problem (include personal factors and/or comorbidities  "that impact the POC) Osteomyelitis of left tibia   Existing Precautions/Restrictions fall;weight bearing   Weight-Bearing Status - LLE (S)  other (see comments)  (\"No weight bearing through forefoot, can WBAT on heel in CAM boot\")   Weight-Bearing Status - RLE full weight-bearing   Cognition   Affect/Mental Status (Cognition) WFL   Orientation Status (Cognition) oriented x 4   Follows Commands (Cognition) WFL   Posture    Posture Not impaired   Range of Motion (ROM)   Range of Motion ROM is WFL   Strength (Manual Muscle Testing)   Strength (Manual Muscle Testing) strength is WFL   Bed Mobility   Bed Mobility no deficits identified   Transfers   Transfers sit-stand transfer   Maintains Weight-bearing Status (Transfers) verbal cues to maintain   Impairments Contributing to Impaired Transfers impaired balance   Sit-Stand Transfer   Sit-Stand [place] (Transfers) supervision;verbal cues   Assistive Device (Sit-Stand Transfers) crutches, axillary   Gait/Stairs (Locomotion)   [place] Level (Gait) supervision;verbal cues   Assistive Device (Gait) crutches, axillary   Distance in Feet (Gait) 10'   Pattern (Gait) swing-to   Balance   Balance Comments Balance grossly impaired with use of axillary crutches.   Clinical Impression   Criteria for Skilled Therapeutic Intervention Yes, treatment indicated   PT Diagnosis (PT) Impaired functional mobility   Influenced by the following impairments Impaired balance, weightbearing restrictions   Functional limitations due to impairments Transfers, gait   Clinical Presentation (PT Evaluation Complexity) stable   Clinical Presentation Rationale Patient presents as medically diagnosed.   Clinical Decision Making (Complexity) low complexity   Planned Therapy Interventions (PT) balance training;gait training;patient/family education;transfer training   Risk & Benefits of therapy have been explained evaluation/treatment results reviewed;care plan/treatment goals reviewed;patient   PT " Total Evaluation Time   PT Eval, Low Complexity Minutes (28548) 10   Therapy Certification   Start of care date 04/24/25   Certification date from 04/24/25   Certification date to 04/24/25   Medical Diagnosis Osteomyelitis of left tibia   Physical Therapy Goals   PT Frequency One time eval and treatment only   PT Predicted Duration/Target Date for Goal Attainment 04/24/25   PT Goals Transfers;Gait   PT: Transfers Modified independent;Sit to/from stand;Assistive device;Within precautions;Goal Met  (FWW)   PT: Gait Modified independent;Assistive device;Within precautions;25 feet  (FWW)   Interventions   Interventions Quick Adds Gait Training   Gait Training   Gait Training Minutes (19990) 10   Symptoms Noted During/After Treatment (Gait Training) none   Treatment Detail/Skilled Intervention Due to impaired balance with axillary crutches, patient trialed FWW x 25' with supervision. Patient was able to maintain weightbearing restrictions without difficulty or imbalance with FWW. Discussed recommendation for continued use of FWW at home.   Distance in Feet 25'   [place] Level (Gait Training) stand-by assist   Physical Assistance Level (Gait Training) supervision   PT Discharge Planning   PT Plan Discharge PT. All goals met.   PT Discharge Recommendation (DC Rec) home with assist   PT Rationale for DC Rec Patient is able to mobilize safely within weightbearing restrictions using FWW.   PT Brief overview of current status Mod I with FWW.   PT Total Distance Amb During Session (feet) 35   PT Equipment Needed at Discharge walker, rolling  (FWW)   Physical Therapy Time and Intention   Timed Code Treatment Minutes 10   Total Session Time (sum of timed and untimed services) 20     Physical Therapy Discharge Summary    Reason for therapy discharge:    All goals and outcomes met, no further needs identified.    Progress towards therapy goal(s). See goals on Care Plan in [facility] electronic health record for goal details.  Goals  met    Therapy recommendation(s):    No further therapy is recommended.

## 2025-04-24 NOTE — PROGRESS NOTES
[facility]    Referral received  from Giulia Liu RN, CM for home Abx.    Benefits verified. Pt has coverage for IV Abx at home under her [facility] plan, however there maybe a copay upon dispense.    CM notified. Writer will speak with pt to review benefits, home infusion and to offer choice of agency/home infusion provider once final plan in place.    Thank you for the referral.    Cesilia Montana RN  [facility] Liaison  [phone](Mon-Fri, 8:00 am-5:00 pm)  [phone] (Office)

## 2025-04-24 NOTE — PLAN OF CARE
PRIMARY DIAGNOSIS: ACUTE PAIN  OUTPATIENT/OBSERVATION GOALS TO BE MET BEFORE DISCHARGE:  1. Pain Status: Improved-controlled with oral pain medications.    2. Return to near baseline physical activity: No    3. Cleared for discharge by consultants (if involved): No    Discharge Planner Nurse   Safe discharge environment identified: Yes  Barriers to discharge: Yes       Entered by: Kelly Wolf RN 04/24/2025 6:59 PM     Please review provider order for any additional goals.   Nurse to notify provider when observation goals have been met and patient is ready for discharge.       Pt A&Ox4. VSS. PRN Oxycodone and Robaxin given.

## 2025-04-25 VITALS
OXYGEN SATURATION: 92 % | SYSTOLIC BLOOD PRESSURE: 118 MMHG | RESPIRATION RATE: 18 BRPM | BODY MASS INDEX: 25.5 KG/M2 | HEART RATE: 61 BPM | TEMPERATURE: 98.3 F | HEIGHT: 67 IN | WEIGHT: 162.48 LBS | DIASTOLIC BLOOD PRESSURE: 69 MMHG

## 2025-04-25 LAB
ACID FAST STAIN (ARUP): NORMAL
ANION GAP SERPL CALCULATED.3IONS-SCNC: 9 MMOL/L (ref 7–15)
BACTERIA TISS BX CULT: ABNORMAL
BACTERIA TISS BX CULT: ABNORMAL
BUN SERPL-MCNC: 9.8 MG/DL (ref 6–20)
CALCIUM SERPL-MCNC: 9.1 MG/DL (ref 8.8–10.4)
CHLORIDE SERPL-SCNC: 106 MMOL/L (ref 98–107)
CREAT SERPL-MCNC: 0.91 MG/DL (ref 0.51–0.95)
EGFRCR SERPLBLD CKD-EPI 2021: 76 ML/MIN/1.73M2
ERYTHROCYTE [DISTWIDTH] IN BLOOD BY AUTOMATED COUNT: 13.5 % (ref 10–15)
GLUCOSE BLDC GLUCOMTR-MCNC: 102 MG/DL (ref 70–99)
GLUCOSE BLDC GLUCOMTR-MCNC: 82 MG/DL (ref 70–99)
GLUCOSE SERPL-MCNC: 86 MG/DL (ref 70–99)
HCO3 SERPL-SCNC: 24 MMOL/L (ref 22–29)
HCT VFR BLD AUTO: 36.2 % (ref 35–47)
HGB BLD-MCNC: 12 G/DL (ref 11.7–15.7)
MCH RBC QN AUTO: 35.4 PG (ref 26.5–33)
MCHC RBC AUTO-ENTMCNC: 33.1 G/DL (ref 31.5–36.5)
MCV RBC AUTO: 107 FL (ref 78–100)
PLATELET # BLD AUTO: 83 10E3/UL (ref 150–450)
POTASSIUM SERPL-SCNC: 3.2 MMOL/L (ref 3.4–5.3)
RBC # BLD AUTO: 3.39 10E6/UL (ref 3.8–5.2)
SODIUM SERPL-SCNC: 139 MMOL/L (ref 135–145)
WBC # BLD AUTO: 4.2 10E3/UL (ref 4–11)

## 2025-04-25 PROCEDURE — 82962 GLUCOSE BLOOD TEST: CPT

## 2025-04-25 PROCEDURE — 250N000013 HC RX MED GY IP 250 OP 250 PS 637: Performed by: STUDENT IN AN ORGANIZED HEALTH CARE EDUCATION/TRAINING PROGRAM

## 2025-04-25 PROCEDURE — 85027 COMPLETE CBC AUTOMATED: CPT | Performed by: FAMILY MEDICINE

## 2025-04-25 PROCEDURE — 250N000013 HC RX MED GY IP 250 OP 250 PS 637: Performed by: PHYSICIAN ASSISTANT

## 2025-04-25 PROCEDURE — 36415 COLL VENOUS BLD VENIPUNCTURE: CPT | Performed by: FAMILY MEDICINE

## 2025-04-25 PROCEDURE — 250N000013 HC RX MED GY IP 250 OP 250 PS 637: Performed by: INTERNAL MEDICINE

## 2025-04-25 PROCEDURE — 99232 SBSQ HOSP IP/OBS MODERATE 35: CPT | Performed by: INTERNAL MEDICINE

## 2025-04-25 PROCEDURE — 250N000013 HC RX MED GY IP 250 OP 250 PS 637: Performed by: FAMILY MEDICINE

## 2025-04-25 PROCEDURE — 80048 BASIC METABOLIC PNL TOTAL CA: CPT | Performed by: FAMILY MEDICINE

## 2025-04-25 RX ORDER — POTASSIUM CHLORIDE 1500 MG/1
40 TABLET, EXTENDED RELEASE ORAL ONCE
Status: COMPLETED | OUTPATIENT
Start: 2025-04-25 | End: 2025-04-25

## 2025-04-25 RX ORDER — MINOCYCLINE HYDROCHLORIDE 100 MG/1
200 CAPSULE ORAL 2 TIMES DAILY
Qty: 56 CAPSULE | Refills: 0 | Status: SHIPPED | OUTPATIENT
Start: 2025-04-25 | End: 2025-05-09

## 2025-04-25 RX ORDER — ASPIRIN 81 MG/1
81 TABLET, COATED ORAL 2 TIMES DAILY
Qty: 60 TABLET | Refills: 0 | Status: SHIPPED | OUTPATIENT
Start: 2025-04-25

## 2025-04-25 RX ORDER — ACETAMINOPHEN 325 MG/1
975 TABLET ORAL EVERY 8 HOURS
Qty: 100 TABLET | Refills: 0 | Status: SHIPPED | OUTPATIENT
Start: 2025-04-25

## 2025-04-25 RX ORDER — AMOXICILLIN 250 MG
1 CAPSULE ORAL 2 TIMES DAILY
Qty: 30 TABLET | Refills: 0 | Status: SHIPPED | OUTPATIENT
Start: 2025-04-25

## 2025-04-25 RX ORDER — OXYCODONE HYDROCHLORIDE 5 MG/1
5-10 TABLET ORAL EVERY 4 HOURS PRN
Qty: 25 TABLET | Refills: 0 | Status: SHIPPED | OUTPATIENT
Start: 2025-04-25

## 2025-04-25 RX ORDER — SULFAMETHOXAZOLE AND TRIMETHOPRIM 800; 160 MG/1; MG/1
2 TABLET ORAL 2 TIMES DAILY
Qty: 56 TABLET | Refills: 0 | Status: SHIPPED | OUTPATIENT
Start: 2025-04-25 | End: 2025-05-09

## 2025-04-25 RX ADMIN — LEVOTHYROXINE SODIUM 88 MCG: 88 TABLET ORAL at 09:33

## 2025-04-25 RX ADMIN — SULFAMETHOXAZOLE AND TRIMETHOPRIM 2 TABLET: 800; 160 TABLET ORAL at 09:22

## 2025-04-25 RX ADMIN — POTASSIUM CHLORIDE 40 MEQ: 1500 TABLET, EXTENDED RELEASE ORAL at 09:40

## 2025-04-25 RX ADMIN — CYANOCOBALAMIN TAB 1000 MCG 1000 MCG: 1000 TAB at 09:33

## 2025-04-25 RX ADMIN — Medication 3 MG: at 01:06

## 2025-04-25 RX ADMIN — ACETAMINOPHEN 975 MG: 325 TABLET ORAL at 09:34

## 2025-04-25 RX ADMIN — PREGABALIN 300 MG: 100 CAPSULE ORAL at 09:21

## 2025-04-25 RX ADMIN — ACETAMINOPHEN 975 MG: 325 TABLET ORAL at 01:30

## 2025-04-25 RX ADMIN — ASPIRIN 81 MG: 81 TABLET, COATED ORAL at 09:20

## 2025-04-25 RX ADMIN — CALCIUM CARBONATE 750 MG: 750 TABLET, CHEWABLE ORAL at 09:22

## 2025-04-25 RX ADMIN — DULOXETINE HYDROCHLORIDE 30 MG: 30 CAPSULE, DELAYED RELEASE ORAL at 09:20

## 2025-04-25 RX ADMIN — OXYCODONE HYDROCHLORIDE 10 MG: 5 TABLET ORAL at 09:33

## 2025-04-25 RX ADMIN — THERA TABS 1 TABLET: TAB at 09:20

## 2025-04-25 RX ADMIN — MINOCYCLINE HYDROCHLORIDE 50 MG: 50 CAPSULE ORAL at 09:22

## 2025-04-25 RX ADMIN — NICOTINE 1 PATCH: 21 PATCH, EXTENDED RELEASE TRANSDERMAL at 09:34

## 2025-04-25 ASSESSMENT — ACTIVITIES OF DAILY LIVING (ADL)
ADLS_ACUITY_SCORE: 31
ADLS_ACUITY_SCORE: 35
ADLS_ACUITY_SCORE: 31

## 2025-04-25 NOTE — PROGRESS NOTES
Care Management Follow Up    Length of Stay (days): 1    Expected Discharge Date: 04/25/2025    Anticipated Discharge Plan:       Transportation: Anticipate Family/friend    PT Recommendations: home with assist  OT Recommendations:        Barriers to Discharge: medical stability    Prior Living Situation: house with parent(s)    Discussed  Partnership in Safe Discharge Planning  document with patient/family: No     Handoff Completed: No, handoff not indicated or clinically appropriate    Patient/Spokesperson Updated: No    Additional Information:  Pt switched to oral abx. Anticipate discharge home with assist from family.     No further care management intervention anticipated at this time.  Please re-consult if further needs arise.  Care management signing off.      Mandi Vincent, NEOSW

## 2025-04-25 NOTE — PLAN OF CARE
Goal Outcome Evaluation:      Plan of Care Reviewed With: patient        Patient discharging home, discharge instructions given to patient. Discharge medications sent to patients pharmacy. Family transport.

## 2025-04-25 NOTE — PROGRESS NOTES
"Orthopedic Progress Note      Assessment: 2 Days Post-Op  S/P Procedure(s):  LEFT FOOT IRRIGATION AND DEBRIDEMENT WITH  GREAT TOE SESAMOID EXCISION,  GASTROCNEMIUS RELEASE     Plan:   - Continue PT/OT  - Weightbearing status: WBAT left lower extremity through the heel with Cam boot on  -Cam boot to be worn at all times besides skin checks and hygiene cares  - Activity: Up with assist and assistive device until independent.  - Anticoagulation: ASA 81 PO BID in addition to SCDs, steve stockings and early ambulation.  - Antibiotics: Currently bactrim and minocycline, infectious disease managing.  Discussed case this morning, waiting on susceptibilities but okay to discharge today on both antibiotics  -Cultures showing stenotrophomonas  - Pain Management; continue current regimen  - Diet: progress diet as tolerated  - Labs: hgb 12.0, transfuse if <7.0. No indication today  - Dressing: Keep dry and intact  - Elevation: Elevate left lower extremity on pillow to keep above the level of the heart as much as possible  - Follow-up: Outpatient follow up in 2 weeks  - Disposition: Anticipate discharge home with assist today      Subjective:  Pain: moderate  Nausea, Vomiting:  No  Lightheadedness, Dizziness:  No  Neuro:  Patient denies new onset numbness or paresthesias  Fever, chills: No  Chest pain: No  SOB: No    Patient reports feeling well today. Patient reports pain is tolerable with current pain regimen. Patient eating and drinking well. Patient voiding and passing gas however no BM. All questions/concerns answered.      Objective:  /69 (BP Location: Left arm)   Pulse 61   Temp 98.3  F (36.8  C) (Oral)   Resp 18   Ht 1.702 m (5' 7\")   Wt 73.7 kg (162 lb 7.7 oz)   LMP  (LMP Unknown)   SpO2 92%   BMI 25.45 kg/m      The patient is A&Ox3. Appears comfortable, sitting up in bed  Calves without tenderness, neg Sandra's  Brisk capillary refill in the toes.    Left foot warm & well-perfused.  Sensation is intact to " light touch & equal bilaterally in the femoral, DP, SP & tibial nerve distributions.  ROM: Appropriately flexes & extends all toes bilaterally.    Leg lengths equal.  Dressing C/D/I without strikethrough, no surrounding erythema.      No drain     Pertinent Labs   Lab Results: personally reviewed.   Lab Results   Component Value Date    INR 1.05 06/23/2010    INR 0.95 03/03/2010    INR 1.07 03/01/2010     Lab Results   Component Value Date    WBC 4.2 04/25/2025    HGB 12.0 04/25/2025    HCT 36.2 04/25/2025     (H) 04/25/2025    PLT 83 (L) 04/25/2025     Lab Results   Component Value Date     04/25/2025    CO2 24 04/25/2025         Report completed by:  MAGGY CALDWELL PA-C  Date: 04/25/2025  [facility]

## 2025-04-25 NOTE — PROGRESS NOTES
"INFECTIOUS DISEASE FOLLOW UP NOTE    Date: 2025   CHIEF COMPLAINT: No chief complaint on file.       ASSESSMENT:    Left foot osteomyelitis: s/p I&D and 1st toe sesamoid excision. Wound swab  with MSSA. Mildly elevated inflammatory markers at that time. OR cultures are growing stenotrophomonas.   HTN    PLAN:  - bactrim plus minocycline for the steno. Will also cover the prior MSSA  - plan bactrim/lindsey for 2 weeks assuming both susceptible and tolerates. Then can continue with one for another month.   - check BMP next week  - follow up ID clinic in 4 weeks, message sent to schedulers  - ID discharge orders placed  - discussed with patient and orthopedics  - ID will sign off. Please call with additional questions or change in clinical status.     Ashley Hahn MD  [facility]   Office Telephone [phone].  Fax [phone]  [employer] paging    ______________________________________________________________________    SUBJECTIVE / INTERVAL HISTORY:  Leg feels ok. Tolerating antibiotics.      ROS: All other systems negative except as listed above.    SH/FH/Habits/PMH reviewed and unchanged.    OBJECTIVE:  /69 (BP Location: Left arm)   Pulse 61   Temp 98.3  F (36.8  C) (Oral)   Resp 18   Ht 1.702 m (5' 7\")   Wt 73.7 kg (162 lb 7.7 oz)   LMP  (LMP Unknown)   SpO2 92%   BMI 25.45 kg/m       Resp: 18    Vital Signs  Temp: 98.1  F (36.7  C)  Temp src: Oral  Resp: 17  Pulse: 64  Pulse Rate Source: Monitor  BP: 132/67  BP Location: Left arm    Temp (24hrs), Av.1  F (36.7  C), Min:97.7  F (36.5  C), Max:98.4  F (36.9  C)      GEN: No acute distress.    RESPIRATORY:  Normal breathing pattern.   EXTREMITIES: No edema.  SKIN/HAIR/NAILS:  No rashes. Foot wraps and boot.   IV:  peripheral IV      Antibiotics:  cefepime    Pertinent labs:  CRP Inflammation   Date Value Ref Range Status   2010 <5.0 0.0 - 8.0 mg/L Final      CBC RESULTS:   Recent Labs   Lab Test " 04/24/25  0641 04/23/25  1121   HGB 12.2  --    PLT  --  93*      Last Comprehensive Metabolic Panel:  Sodium   Date Value Ref Range Status   04/25/2025 139 135 - 145 mmol/L Final   02/06/2013 136 133 - 144 mmol/L Final     Potassium   Date Value Ref Range Status   04/25/2025 3.2 (L) 3.4 - 5.3 mmol/L Final   02/06/2013 4.1 3.4 - 5.3 mmol/L Final     Chloride   Date Value Ref Range Status   04/25/2025 106 98 - 107 mmol/L Final   02/06/2013 100 94 - 109 mmol/L Final     Carbon Dioxide   Date Value Ref Range Status   02/06/2013 26 20 - 32 mmol/L Final     Carbon Dioxide (CO2)   Date Value Ref Range Status   04/25/2025 24 22 - 29 mmol/L Final     Anion Gap   Date Value Ref Range Status   04/25/2025 9 7 - 15 mmol/L Final   02/06/2013 10 6 - 17 mmol/L Final     Glucose   Date Value Ref Range Status   04/25/2025 86 70 - 99 mg/dL Final   02/06/2013 88 60 - 99 mg/dL Final     GLUCOSE BY METER POCT   Date Value Ref Range Status   04/25/2025 82 70 - 99 mg/dL Final     Urea Nitrogen   Date Value Ref Range Status   04/25/2025 9.8 6.0 - 20.0 mg/dL Final   02/06/2013 16 5 - 24 mg/dL Final     Creatinine   Date Value Ref Range Status   04/25/2025 0.91 0.51 - 0.95 mg/dL Final   02/06/2013 0.67 0.52 - 1.04 mg/dL Final     GFR Estimate   Date Value Ref Range Status   04/25/2025 76 >60 mL/min/1.73m2 Final     Comment:     eGFR calculated using 2021 CKD-EPI equation.   02/06/2013 >90 >60 mL/min/1.7m2 Final     Calcium   Date Value Ref Range Status   04/25/2025 9.1 8.8 - 10.4 mg/dL Final   02/06/2013 8.9 8.5 - 10.4 mg/dL Final        MICROBIOLOGY DATA:  Personally reviewed.  7-Day Micro Results       Collected Updated Procedure Result Status      04/23/2025 0811 04/23/2025 1210 Acid-Fast Bacilli Culture and Stain [[id number]]    Tissue from Tibia, Left    In process Component Value   No component results            04/23/2025 0811 04/23/2025 1338 Gram Stain [[id number]]   (Abnormal)   Tissue from Tibia, Left    Final result Component  Value   GS Culture See corresponding culture for results   Gram Stain Result 1+ Gram negative bacilli   Gram Stain Result 1+ WBC seen            04/23/2025 0811 04/24/2025 1301 Fungal or Yeast Culture Routine [[id number]]   Tissue from Tibia, Left    Preliminary result Component Value   Culture No growth after 1 day  [P]                04/23/2025 0811 04/25/2025 1202 Tissue Aerobic Bacterial Culture Routine [[id number]]   (Abnormal)   Tissue from Tibia, Left    Preliminary result Component Value   Culture Culture in progress  [P]     1+ Stenotrophomonas maltophilia  [P]     Isolated in broth only Staphylococcus epidermidis  [P]     On day 1 of incubation               04/23/2025 0811 04/25/2025 1016 Acid-Fast Bacilli Culture and Stain [[id number]]    Tissue from Tibia, Left    Preliminary result Component Value   Acid Fast Stain No acid fast bacilli seen  [P]    Performed By: [facility] [place] [address] [id number] Director: Josue Lee MD, PhDCLIA Number: [id number]   Acid Fast Stain No acid fast bacilli seen  [P]    Performed By: [employer] [place] [address] [id number] Director: Josue Lee MD, PhDCLIA Number: [id number]  This is an appended report. These results have been appended to a previously preliminary verified report.            04/23/2025 0802 04/23/2025 0834 Acid-Fast Bacilli Culture and Stain [[id number]]    Tissue from Toe, Left    In process Component Value   No component results            04/23/2025 0802 04/23/2025 1343 Gram Stain [[id number]]   (Abnormal)   Tissue from Toe, Left    Final result Component Value   GS Culture See corresponding culture for results   Gram Stain Result 1+ Gram negative bacilli   Gram Stain Result 1+ WBC seen            04/23/2025 0802 04/24/2025 1246 Fungal or Yeast Culture Routine [[id number]]   Tissue from Toe, Left    Preliminary result Component Value   Culture No growth after 1 day  [P]                 04/23/2025 0802 04/25/2025 1156 Tissue Aerobic Bacterial Culture Routine [[id number]]   (Abnormal)   Tissue from Toe, Left    Preliminary result Component Value   Culture 1+ Stenotrophomonas maltophilia  [P]     Susceptibilities done on previous cultures    1+ Normal sergio  [P]                04/23/2025 0802 04/25/2025 1011 Acid-Fast Bacilli Culture and Stain [[id number]]    Tissue from Toe, Left    Preliminary result Component Value   Acid Fast Stain No acid fast bacilli seen  [P]    Performed By: [employer] [facility] [place], [place] [address]oratory Director: Josue Lee MD, PhDCLIA Number: [id number]   Acid Fast Stain No acid fast bacilli seen  [P]    Performed By: [employer] [place], [place] [address]oratory Director: Josue Lee MD, PhDCLIA Number: [id number]  This is an appended report. These results have been appended to a previously preliminary verified report.            04/23/2025 0801 04/23/2025 0834 Acid-Fast Bacilli Culture and Stain [[id number]]    Tissue from Foot, Left    In process Component Value   No component results            04/23/2025 0801 04/24/2025 1231 Fungal or Yeast Culture Routine [[id number]]   Tissue from Foot, Left    Preliminary result Component Value   Culture No growth after 1 day  [P]                04/23/2025 0801 04/24/2025 1256 Tissue Aerobic Bacterial Culture Routine [[id number]]   (Abnormal)   Tissue from Foot, Left    Preliminary result Component Value   Culture Culture in progress  [P]     1+ Stenotrophomonas maltophilia  [P]     Identification is preliminary, confirmation in progress               04/23/2025 0801 04/25/2025 1019 Acid-Fast Bacilli Culture and Stain [[id number]]    Tissue from Foot, Left    Preliminary result Component Value   Acid Fast Stain No acid fast bacilli seen  [P]    Performed By: [employer] [facility] [place] [address] [id number] Director: Josue Lee,  MD, PhDCLIA Number: [id number]   Acid Fast Stain No acid fast bacilli seen  [P]    Performed By: [facility] [place] [address] [id number] Director: Josue Lee MD, PhDCLIA Number: [id number]  This is an appended report. These results have been appended to a previously preliminary verified report.            04/23/2025 0757 04/25/2025 1205 Anaerobic Bacterial Culture Routine [[id number]]   Tissue from Femoral Head, Left    Preliminary result Component Value   Culture No anaerobic organisms isolated after 2 days  [P]                         RADIOLOGY:  Personally Reviewed.  No results found for this or any previous visit (from the past 24 hours).    Active Problems:    Other acute osteomyelitis of left foot (H)    Osteomyelitis (H)

## 2025-04-25 NOTE — PLAN OF CARE
PRIMARY DIAGNOSIS: ACUTE PAIN  OUTPATIENT/OBSERVATION GOALS TO BE MET BEFORE DISCHARGE:  1. Pain Status: Improved-controlled with oral pain medications.    2. Return to near baseline physical activity: Yes    3. Cleared for discharge by consultants (if involved): Yes    Discharge Planner Nurse   Safe discharge environment identified: Yes  Barriers to discharge: Yes       Entered by: Anupama Genao RN 04/25/2025 12:51 AM     Please review provider order for any additional goals.   Nurse to notify provider when observation goals have been met and patient is ready for discharge.Goal Outcome Evaluation:

## 2025-04-25 NOTE — PROGRESS NOTES
"[facility]    Medicine Progress Note - Hospitalist Service    Date of Admission:  4/23/2025    Assessment & Plan   Kim Berman is a 50 year old female with hx idiopathic neuropathy admitted by surgeon Dr Luna for osteomyelitis of left tibia.  S/p  I&D, left great toe sesamoid excision.    Osteomyelitis of left tibia - due to stenotrophomonas   -POD #2 s/p I&D, left great toe sesamoid excision.  - Wound swab MSSA  - OR culture stenotrophomonas  - ID following > now on bactrim + minocycline  - Activity SBA with crutches  - PT ordered  - Elevating left lower extremity  - [facility] following  - Anticipate she can discharge when sensitivities return     HTN  - Blood pressure stable here   - check bmp  - Not on any home blood pressure meds    Hypothyroidism due to Hashimoto's thyroiditis   - Continue home Synthroid    RLS   - home requip    Peripheral neuropathy  --- States was diagnosed through neurology years ago and now follows with primary care  --- Continue Lyrica and Cymbalta    Tobacco use  -reordered nicotine patch, she would like to discharge on zyban           Diet: Regular Diet Adult    DVT Prophylaxis: Defer to primary service  Baig Catheter: Not present  Lines: None     Cardiac Monitoring: None  Code Status: Full Code      Clinically Significant Risk Factors Present on Admission        # Hypokalemia: Lowest K = 3.2 mmol/L in last 2 days, will replace as needed          # Thrombocytopenia: Lowest platelets = 83 in last 2 days, will monitor for bleeding   # Hypertension: Noted on problem list           # Overweight: Estimated body mass index is 25.45 kg/m  as calculated from the following:    Height as of this encounter: 1.702 m (5' 7\").    Weight as of this encounter: 73.7 kg (162 lb 7.7 oz).       # Financial/Environmental Concerns: none         Social Drivers of Health    Tobacco Use: High Risk (4/23/2025)    Patient History     Smoking Tobacco Use: Every Day     Smokeless " Tobacco Use: Never     Passive Exposure: Never   Physical Activity: Inactive (1/17/2022)    Received from [facility]    Exercise Vital Sign     Days of Exercise per Week: 0 days     Minutes of Exercise per Session: 0 min          Disposition Plan     Medically Ready for Discharge: Anticipated Tomorrow             Tr Kaiser MD  Hospitalist Service  [facility]  Securely message with dentaZOOM (more info)  Text page via [employer] Paging/Directory   ______________________________________________________________________    Interval History   No complaints today.  Her pain is well controlled.  She would like to discharge on zyban to try to quit smoking (reports it is all she hasn't tried).    Physical Exam   Vital Signs: Temp: 98.4  F (36.9  C) Temp src: Oral BP: (!) 143/69 Pulse: 63   Resp: 18 SpO2: 93 % O2 Device: None (Room air)    Weight: 162 lbs 7.66 oz    General Appearance: Pleasant female no apparent distress  Respiratory: Clear to auscultation bilaterally  Cardiovascular: regular rate and rhythm  GI: soft, nontender, no hsm or masses  Skin: cam boot on left leg - incision not examined - no RLE edema  Other: Neuro intact, no focal deficits appeciated     Medical Decision Making             Data     I have personally reviewed the following data over the past 24 hrs:    4.2  \   12.0   / 83 (L)     139 106 9.8 /  86   3.2 (L) 24 0.91 \       Imaging results reviewed over the past 24 hrs:   No results found for this or any previous visit (from the past 24 hours).

## 2025-04-25 NOTE — DISCHARGE SUMMARY
ORTHOPEDIC DISCHARGE SUMMARY       Kim Berman,  1975, MRN [id number]    Admission Date: 2025      Admission Diagnoses: Osteomyelitis of left tibia, unspecified type (H) [M86.9]  Other acute osteomyelitis of left foot (H) [M86.172]     Discharge Date: 2025    Post-operative Day:  2 Days Post-Op    Reason for Admission: The patient was admitted for the following: Procedure(s):  LEFT FOOT IRRIGATION AND DEBRIDEMENT WITH  GREAT TOE SESAMOID EXCISION,  GASTROCNEMIUS RELEASE    BRIEF HOSPITAL COURSE   Kim Berman is a pleasant 50 year old female who underwent the aforementioned procedure with Dr. Luna on 25. There were no intraoperative complications and the patient was transferred to the recovery room and later the short stay unit in stable condition. Once the patient reached the floor our orthopedic pain protocol was implemented along with the following:    Anticoagulation Medications: ASA 81 BID x 4 weeks  Therapy: PT and OT  Activity: Heel weightbearing with CAM boot on  Bracing: CAM boot at all time    Consultations during Admission: Hospitalist service for medical management   Infectious disease consult for antibiotic regimen    Patient progressing well with PT/OT and was discharged  with antibiotic plan for bactrim and minocycline x 2 weeks.  Will continue to tailor antibiotics based on susceptibilities, still pending at time of discharge.  Check BMP with PCP 1 week    COMPLICATIONS/SIGNIFICANT FINDINGS    Stenotrophomonas on cultures    DISCHARGE INFORMATION   Condition at discharge: Good  Discharge destination: Home  Patient was seen by myself on the date of discharge.    FOLLOW UP CARE   Follow up with orthopedics in 1 weeks or sooner should the need arise. Ortho will continue to manage pain control, post op anticoagulation and incision care.     Follow up with your PCP for management of chronic medical problems and to evaluate for post op medical complications including  "constipation, nausea/vomiting, DVT/PE, anemia, changes in blood pressure, fevers/chills, urinary retention and atelectasis/pneumonia. Please get BMP with PCP in 1 week    Subjective   Patient is doing well on POD #2. Pain is well controlled with oral medications. Ambulating. Tolerating oral intake.  Denies nausea and vomiting.  Denies fevers/chills.    Physical Exam   /69 (BP Location: Left arm)   Pulse 61   Temp 98.3  F (36.8  C) (Oral)   Resp 18   Ht 1.702 m (5' 7\")   Wt 73.7 kg (162 lb 7.7 oz)   LMP  (LMP Unknown)   SpO2 92%   BMI 25.45 kg/m    The patient is A&Ox3. Appears comfortable, sitting up in bed  Calves without tenderness, neg Sandra's  Brisk capillary refill in the toes.    Left foot warm & well-perfused.  Sensation is intact to light touch & equal bilaterally in the femoral, DP, SP & tibial nerve distributions.  ROM: Appropriately flexes & extends all toes bilaterally.    Leg lengths equal.  Dressing C/D/I without strikethrough, no surrounding erythema.    Pertinent Results at Discharge     Hemoglobin   Date/Time Value Ref Range Status   04/25/2025 06:08 AM 12.0 11.7 - 15.7 g/dL Final   04/24/2025 06:41 AM 12.2 11.7 - 15.7 g/dL Final   09/30/2011 01:00 PM 12.1 11.7 - 15.7 g/dL Final   06/24/2010 07:29 AM 8.8 (L) 11.7 - 15.7 g/dL Final   06/23/2010 11:46 PM 8.8 (L) 11.7 - 15.7 g/dL Final     INR   Date/Time Value Ref Range Status   06/23/2010 11:46 PM 1.05 0.86 - 1.14 Final   03/03/2010 05:55 AM 0.95 0.86 - 1.14 Final   03/01/2010 06:05 PM 1.07 0.86 - 1.14 Final     Platelet Count   Date/Time Value Ref Range Status   04/25/2025 06:08 AM 83 (L) 150 - 450 10e3/uL Final   04/23/2025 11:21 AM 93 (L) 150 - 450 10e3/uL Final   06/24/2010 07:29  (L) 150 - 450 10e9/L Final   06/23/2010 11:46 PM 56 (L) 150 - 450 10e9/L Final   06/23/2010 03:45  150 - 450 10e9/L Final       Problem List   Active Problems:    Other acute osteomyelitis of left foot (H)    Osteomyelitis (H)      MAGGY" TIMA CALDWELL PA-C/Dr. Luna  [facility]  [phone]  Date: 4/25/2025  Time: 12:42 PM

## 2025-04-25 NOTE — PLAN OF CARE
Goal Outcome Evaluation:         Problem: Adult Inpatient Plan of Care  Goal: Plan of Care Review  Description: The Plan of Care Review/Shift note should be completed every shift.  The Outcome Evaluation is a brief statement about your assessment that the patient is improving, declining, or no change.  This information will be displayed automatically on your shiftnote.  Outcome: Progressing     Problem: Adult Inpatient Plan of Care  Goal: Optimal Comfort and Wellbeing  Intervention: Monitor Pain and Promote Comfort  Recent Flowsheet Documentation  Taken 4/24/2025 1809 by Kelly Wolf RN  Pain Management Interventions: MD notified (comment)  Taken 4/24/2025 1600 by Kelly Wolf RN  Pain Management Interventions: medication (see MAR)  Taken 4/24/2025 1554 by Kelly Wolf RN  Pain Management Interventions: medication (see MAR)     Problem: Surgery Nonspecified  Goal: Optimal Pain Control and Function  Intervention: Prevent or Manage Pain  Recent Flowsheet Documentation  Taken 4/24/2025 1809 by Kelly Wolf RN  Pain Management Interventions: MD notified (comment)  Taken 4/24/2025 1600 by Kelly Wolf RN  Pain Management Interventions: medication (see MAR)  Taken 4/24/2025 1554 by Kelly Wolf RN  Pain Management Interventions: medication (see MAR)     Pt A&O. VSS. 7/10 LLE pain. PRN Oxycodone and scheduled Tylenol given. LLE ace wrapped and CAM boot on. PO antibiotics given per MAR.

## 2025-04-25 NOTE — PLAN OF CARE
Problem: Adult Inpatient Plan of Care  Goal: Optimal Comfort and Wellbeing  Outcome: Progressing  Intervention: Monitor Pain and Promote Comfort  Recent Flowsheet Documentation  Taken 4/25/2025 0418 by Anupama Genao RN  Pain Management Interventions:   rest   quiet environment facilitated  Taken 4/25/2025 0217 by Anupama Genao RN  Pain Management Interventions:   rest   quiet environment facilitated  Taken 4/25/2025 0130 by Anupama Genao RN  Pain Management Interventions: medication (see MAR)  Taken 4/24/2025 2354 by Anupama Genao RN  Pain Management Interventions: medication (see MAR)  Taken 4/24/2025 2352 by Anupama Genao RN  Pain Management Interventions:   rest   quiet environment facilitated     Problem: Surgery Nonspecified  Goal: Blood Glucose Level Within Targeted Range  Outcome: Progressing     Problem: Pain Acute  Goal: Optimal Pain Control and Function  Outcome: Progressing  Intervention: Develop Pain Management Plan  Recent Flowsheet Documentation  Taken 4/25/2025 0418 by Anupama Genao RN  Pain Management Interventions:   rest   quiet environment facilitated  Taken 4/25/2025 0217 by Anupama Genao RN  Pain Management Interventions:   rest   quiet environment facilitated  Taken 4/25/2025 0130 by Anupama Genao RN  Pain Management Interventions: medication (see MAR)  Taken 4/24/2025 2354 by Anupama Genao RN  Pain Management Interventions: medication (see MAR)  Taken 4/24/2025 2352 by Anupama Genao RN  Pain Management Interventions:   rest   quiet environment facilitated  Intervention: Prevent or Manage Pain  Recent Flowsheet Documentation  Taken 4/25/2025 0418 by Anupama Genao RN  Medication Review/Management: medications reviewed  Taken 4/24/2025 2354 by Anupama Genao RN  Medication Review/Management: medications reviewed     Problem: Adult Inpatient Plan of Care  Goal: Absence of Hospital-Acquired Illness or Injury  Intervention: Prevent Skin Injury  Recent  Flowsheet Documentation  Taken 4/25/2025 0418 by Anupama Genao, RN  Body Position: position changed independently  Taken 4/24/2025 2354 by Anupama Genao, RN  Body Position: position changed independently     Problem: Surgery Nonspecified  Goal: Effective Oxygenation and Ventilation  Intervention: Optimize Oxygenation and Ventilation  Recent Flowsheet Documentation  Taken 4/25/2025 0418 by Anupama Genao, RN  Activity Management: activity adjusted per tolerance  Head of Bed (HOB) Positioning: HOB at 30-45 degrees  Taken 4/24/2025 2354 by Anupama Genao, RN  Activity Management: activity adjusted per tolerance  Head of Bed (HOB) Positioning: HOB at 30-45 degrees   Goal Outcome Evaluation:       Patient came in with osteomyelitis of left foot. Had I & D yesterday. Left foot wrapped and CAM boot in place. Scheduled Tylenol administered for pain. AOX4. RA. PT consulted. Right PIV saline locked. Stand by assist to the commode. PRN melatonin given for sleep. Regular diet. Slept well.

## 2025-04-26 LAB
BACTERIA TISS BX CULT: ABNORMAL
BACTERIA TISS BX CULT: ABNORMAL

## 2025-04-27 LAB
BACTERIA TISS BX CULT: ABNORMAL
BACTERIA TISS BX CULT: ABNORMAL

## 2025-04-28 LAB
PATH REPORT.COMMENTS IMP SPEC: NORMAL
PATH REPORT.COMMENTS IMP SPEC: NORMAL
PATH REPORT.FINAL DX SPEC: NORMAL
PATH REPORT.GROSS SPEC: NORMAL
PATH REPORT.MICROSCOPIC SPEC OTHER STN: NORMAL
PATH REPORT.RELEVANT HX SPEC: NORMAL
PHOTO IMAGE: NORMAL

## 2025-04-28 PROCEDURE — 88311 DECALCIFY TISSUE: CPT | Mod: 26 | Performed by: PATHOLOGY

## 2025-04-28 PROCEDURE — 88305 TISSUE EXAM BY PATHOLOGIST: CPT | Mod: 26 | Performed by: PATHOLOGY

## 2025-04-30 LAB — BACTERIA TISS BX CULT: NORMAL

## 2025-05-01 LAB
BACTERIA TISS BX CULT: NORMAL

## 2025-05-08 LAB
BACTERIA TISS BX CULT: NORMAL

## 2025-05-13 ENCOUNTER — RESULTS FOLLOW-UP (OUTPATIENT)
Dept: MULTI SPECIALTY CLINIC | Facility: CLINIC | Age: 50
End: 2025-05-13

## 2025-05-15 LAB
BACTERIA TISS BX CULT: NORMAL

## 2025-05-21 LAB
BACTERIA TISS BX CULT: NO GROWTH

## 2025-06-06 ENCOUNTER — TELEPHONE (OUTPATIENT)
Dept: INFECTIOUS DISEASES | Facility: CLINIC | Age: 50
End: 2025-06-06
Payer: COMMERCIAL

## 2025-06-06 NOTE — TELEPHONE ENCOUNTER
Refill request from [facility]# [id number]    Medication: Nystatin mouth   Last Refill:  5/14/2025  Last OV:  6/16/2025  Last Lab: 5/9/20025  Future lab: -  Future OV: 6/16/2025      RX sent to review.

## 2025-06-06 NOTE — TELEPHONE ENCOUNTER
Pt needs to be r/s- pt is scheduled on 6/16 as return pt. Pt saw  only IP. Pt needs to see  or r/s as a NP with another provider.

## 2025-06-09 NOTE — TELEPHONE ENCOUNTER
06.09- [facility]   Pt needs to be r/s- pt is scheduled on 6/16 as return pt. Pt saw  only IP. Pt needs to see  or r/s as a NP with another provider.

## 2025-06-18 LAB
ACID FAST STAIN (ARUP): NORMAL

## 2025-07-08 ENCOUNTER — OFFICE VISIT (OUTPATIENT)
Dept: INFECTIOUS DISEASES | Facility: CLINIC | Age: 50
End: 2025-07-08
Attending: INTERNAL MEDICINE
Payer: COMMERCIAL

## 2025-07-08 VITALS
SYSTOLIC BLOOD PRESSURE: 120 MMHG | WEIGHT: 154.6 LBS | TEMPERATURE: 99.1 F | HEART RATE: 82 BPM | DIASTOLIC BLOOD PRESSURE: 70 MMHG | OXYGEN SATURATION: 97 % | BODY MASS INDEX: 24.27 KG/M2 | HEIGHT: 67 IN

## 2025-07-08 DIAGNOSIS — M86.172 OTHER ACUTE OSTEOMYELITIS OF LEFT FOOT (H): Primary | ICD-10-CM

## 2025-07-08 PROCEDURE — 3078F DIAST BP <80 MM HG: CPT | Performed by: INTERNAL MEDICINE

## 2025-07-08 PROCEDURE — 3074F SYST BP LT 130 MM HG: CPT | Performed by: INTERNAL MEDICINE

## 2025-07-08 PROCEDURE — 99214 OFFICE O/P EST MOD 30 MIN: CPT | Performed by: INTERNAL MEDICINE

## 2025-07-08 NOTE — PROGRESS NOTES
[facility] [place] [facility] FOLLOW UP NOTE      Date: 07/08/2025   Patient Name: Kim Berman   YOB: 1975  MRN: [id number]      ASSESSMENT:  Left foot osteomyelitis: s/p I&D and 1st toe sesamoid excision. Wound swab 4/8 with MSSA. Mildly elevated inflammatory markers at that time. OR cultures are growing stenotrophomonas. Completed course of bactrim  HTN  Underwent additional I&D Mid may-those records presently not available.  Continues to have wound on bottom of foot-surrounded by some macerated skin. There's no signs of infection presently.     PLAN:  - needs good wound care for this to heal-she has apt with [facility] later this month but will try and move it up  - questions answered.     Ashley Hahn MD  [facility]   Clinic phone: [phone]   Clinic fax: [phone]    ______________________________________________________________________    SUBJECTIVE / INTERVAL HISTORY: Kim Berman returns for follow up of foot.    Since last seen by me in April, she tolerated the bactrim after discharge. Mid may had another procedure, cannot see those records. She states that the incision was bigger but is now getting better. There is a stitch hanging out. Hasn't follow up with wound or ortho after discharge. She's in walking shoes but has a boot at home. No fevers or chills.     ROS: All other systems negative except as listed above.      Current Outpatient Medications:     acetaminophen (TYLENOL) 325 MG tablet, Take 3 tablets (975 mg) by mouth every 8 hours., Disp: 100 tablet, Rfl: 0    aspirin (ASA) 81 MG EC tablet, Take 1 tablet (81 mg) by mouth 2 times daily., Disp: 60 tablet, Rfl: 0    calcium carbonate 750 MG CHEW, Take 750 mg by mouth daily., Disp: , Rfl:     DULoxetine (CYMBALTA) 30 MG capsule, Take 30 mg by mouth every morning., Disp: , Rfl:     DULoxetine (CYMBALTA) 60 MG capsule, Take 60 mg by mouth every evening., Disp: , Rfl:     levothyroxine  "(SYNTHROID/LEVOTHROID) 88 MCG tablet, Take 88 mcg by mouth daily., Disp: , Rfl:     methocarbamol (ROBAXIN) 750 MG tablet, Take 750 mg by mouth 4 times daily as needed for muscle spasms., Disp: , Rfl:     multivitamin, therapeutic (THERA-VIT) TABS tablet, Take 1 tablet by mouth daily., Disp: , Rfl:     oxyCODONE (ROXICODONE) 5 MG tablet, Take 1-2 tablets (5-10 mg) by mouth every 4 hours as needed for moderate to severe pain., Disp: 25 tablet, Rfl: 0    pregabalin (LYRICA) 300 MG capsule, Take 300 mg by mouth 2 times daily., Disp: , Rfl:     rOPINIRole (REQUIP) 1 MG tablet, Take 1 mg by mouth at bedtime., Disp: , Rfl:     senna-docusate (SENOKOT-S/PERICOLACE) 8.6-50 MG tablet, Take 1 tablet by mouth 2 times daily., Disp: 30 tablet, Rfl: 0    vitamin B-12 (CYANOCOBALAMIN) 1000 MCG tablet, Take 1,000 mcg by mouth daily., Disp: , Rfl:     vitamin D2 (ERGOCALCIFEROL) 73236 units (1250 mcg) capsule, Take 50,000 Units by mouth twice a week., Disp: , Rfl:     ondansetron (ZOFRAN ODT) 4 MG ODT tab, Take 1 tablet (4 mg) by mouth every 8 hours as needed for nausea. (Patient not taking: Reported on 7/8/2025), Disp: 20 tablet, Rfl: 0    sulfamethoxazole-trimethoprim (BACTRIM DS) 800-160 MG tablet, Take 2 tablets by mouth 2 times daily. (Patient not taking: Reported on 7/8/2025), Disp: 112 tablet, Rfl: 0      OBJECTIVE:  /70 (BP Location: Right arm, Patient Position: Sitting, Cuff Size: Adult Regular)   Pulse 82   Temp 99.1  F (37.3  C) (Oral)   Ht 1.702 m (5' 7\")   Wt 70.1 kg (154 lb 9.6 oz)   SpO2 97%   BMI 24.21 kg/m        GEN: No acute distress.    RESPIRATORY:  Normal breathing pattern. Clear to auscultation  CARDIOVASCULAR:  Regular rate and rhythm. No murmur, click, gallop or rub.   ABDOMEN:  Soft, normal bowel sounds, non-tender, no masses, no organomegaly.  EXTREMITIES: No edema.  SKIN/HAIR/NAILS:  macerated skin bottom of foot, presently no signs of infection        Pertinent labs:    CRP Inflammation "   Date Value Ref Range Status   06/25/2010 <5.0 0.0 - 8.0 mg/L Final     Last Comprehensive Metabolic Panel:  Sodium   Date Value Ref Range Status   05/09/2025 134 (L) 135 - 145 mmol/L Final   02/06/2013 136 133 - 144 mmol/L Final     Potassium   Date Value Ref Range Status   05/09/2025 4.3 3.4 - 5.3 mmol/L Final   02/06/2013 4.1 3.4 - 5.3 mmol/L Final     Chloride   Date Value Ref Range Status   05/09/2025 97 (L) 98 - 107 mmol/L Final   02/06/2013 100 94 - 109 mmol/L Final     Carbon Dioxide   Date Value Ref Range Status   02/06/2013 26 20 - 32 mmol/L Final     Carbon Dioxide (CO2)   Date Value Ref Range Status   05/09/2025 23 22 - 29 mmol/L Final     Anion Gap   Date Value Ref Range Status   05/09/2025 14 7 - 15 mmol/L Final   02/06/2013 10 6 - 17 mmol/L Final     Glucose   Date Value Ref Range Status   05/09/2025 119 (H) 70 - 99 mg/dL Final   02/06/2013 88 60 - 99 mg/dL Final     GLUCOSE BY METER POCT   Date Value Ref Range Status   04/25/2025 82 70 - 99 mg/dL Final     Urea Nitrogen   Date Value Ref Range Status   05/09/2025 18.8 6.0 - 20.0 mg/dL Final   02/06/2013 16 5 - 24 mg/dL Final     Creatinine   Date Value Ref Range Status   05/09/2025 0.77 0.51 - 0.95 mg/dL Final   02/06/2013 0.67 0.52 - 1.04 mg/dL Final     GFR Estimate   Date Value Ref Range Status   05/09/2025 >90 >60 mL/min/1.73m2 Final     Comment:     eGFR calculated using 2021 CKD-EPI equation.   02/06/2013 >90 >60 mL/min/1.7m2 Final     Calcium   Date Value Ref Range Status   05/09/2025 8.2 (L) 8.8 - 10.4 mg/dL Final   02/06/2013 8.9 8.5 - 10.4 mg/dL Final     CBC RESULTS:   Recent Labs   Lab Test 04/25/25  0608   WBC 4.2   RBC 3.39*   HGB 12.0   HCT 36.2   *   MCH 35.4*   MCHC 33.1   RDW 13.5   PLT 83*       MICROBIOLOGY DATA:    T Cell Subset:  WBC   Date Value Ref Range Status   06/24/2010 3.7 (L) 4.0 - 11.0 10e9/L Final     WBC Count   Date Value Ref Range Status   04/25/2025 4.2 4.0 - 11.0 10e3/uL Final     % Lymphocytes   Date Value  Ref Range Status   06/23/2010 25 20 - 48 % Final       RADIOLOGY:  No results found for this or any previous visit (from the past 744 hours).     Total Time Spent 15 minutes including chart review, time with patient, orders, and documentation.

## (undated) DEVICE — SU PROLENE 3-0 PS-1 18" 8663G

## (undated) DEVICE — ESU GROUND PAD ADULT REM W/15' CORD E7507DB

## (undated) DEVICE — DRAPE STERI TOWEL LG 1010

## (undated) DEVICE — GLOVE UNDER INDICATOR PI SZ 7.0 LF 41670

## (undated) DEVICE — SOL WATER IRRIG 1000ML BOTTLE 2F7114

## (undated) DEVICE — CUFF TOURN 30IN STRL DISP 5921030235

## (undated) DEVICE — TUBING IRR LG BORE TUBE DRIP CHMBR 2 BG 94IN 313003

## (undated) DEVICE — SU ETHILON 3-0 PS-1 18" 1663G

## (undated) DEVICE — BLADE KNIFE SURG 15 371115

## (undated) DEVICE — SU VICRYL+ 3-0 27IN SH UND VCP416H

## (undated) DEVICE — SUCTION MANIFOLD NEPTUNE 2 SYS 1 PORT 702-025-000

## (undated) DEVICE — SOL NACL 0.9% IRRIG 1000ML BOTTLE 2F7124

## (undated) DEVICE — CONTAINER URINE SPEC 4OZ STRL 1053

## (undated) DEVICE — SUTURE PDS 2-0 27 VIO CT-2 + VLT PDP333

## (undated) DEVICE — CUSTOM PACK LOWER EXTREMITY SOP5BLEHEA

## (undated) DEVICE — SOL NACL 0.9% IRRIG 3000ML BAG 2B7127

## (undated) DEVICE — GLOVE BIOGEL PI INDICATOR 9.0 LF  41690

## (undated) DEVICE — ADH LIQUID MASTISOL TOPICAL VIAL 2-3ML 0523-48

## (undated) DEVICE — ESU PENCIL SMOKE EVAC W/ROCKER SWITCH 0703-047-000

## (undated) DEVICE — DRSG STERI STRIP 1/2X4" R1547

## (undated) DEVICE — GLOVE BIOGEL PI ULTRATOUCH G SZ 7.0 42170

## (undated) DEVICE — GLOVE SURG PI ULTRA TOUCH M SZ 8 LF

## (undated) DEVICE — DRAPE C-ARM 60X42" 1013

## (undated) DEVICE — DRAPE STERI U 1015

## (undated) DEVICE — PREP CHLORAPREP 26ML TINTED HI-LITE ORANGE 930815

## (undated) DEVICE — GOWN LG DISP 9515

## (undated) DEVICE — DRSG GAUZE 4X4" TRAY 6939

## (undated) DEVICE — DRSG ADAPTIC 3X8" 6113

## (undated) DEVICE — PADDING CAST 4IN WEBRIL STRL 2502

## (undated) RX ORDER — BUPIVACAINE HYDROCHLORIDE 5 MG/ML
INJECTION, SOLUTION EPIDURAL; INTRACAUDAL; PERINEURAL
Status: DISPENSED
Start: 2025-04-23

## (undated) RX ORDER — LIDOCAINE HYDROCHLORIDE 20 MG/ML
INJECTION, SOLUTION INFILTRATION; PERINEURAL
Status: DISPENSED
Start: 2025-04-23

## (undated) RX ORDER — PROPOFOL 10 MG/ML
INJECTION, EMULSION INTRAVENOUS
Status: DISPENSED
Start: 2025-04-23

## (undated) RX ORDER — LIDOCAINE HYDROCHLORIDE 10 MG/ML
INJECTION, SOLUTION EPIDURAL; INFILTRATION; INTRACAUDAL; PERINEURAL
Status: DISPENSED
Start: 2025-04-23